# Patient Record
Sex: FEMALE | Race: WHITE | Employment: FULL TIME | URBAN - METROPOLITAN AREA
[De-identification: names, ages, dates, MRNs, and addresses within clinical notes are randomized per-mention and may not be internally consistent; named-entity substitution may affect disease eponyms.]

---

## 2018-11-05 ENCOUNTER — ANESTHESIA (OUTPATIENT)
Dept: LABOR AND DELIVERY | Age: 28
End: 2018-11-05
Payer: COMMERCIAL

## 2018-11-05 ENCOUNTER — HOSPITAL ENCOUNTER (INPATIENT)
Age: 28
LOS: 3 days | Discharge: HOME | End: 2018-11-08
Attending: OBSTETRICS & GYNECOLOGY | Admitting: OBSTETRICS & GYNECOLOGY
Payer: COMMERCIAL

## 2018-11-05 ENCOUNTER — ANESTHESIA EVENT (OUTPATIENT)
Dept: LABOR AND DELIVERY | Age: 28
End: 2018-11-05
Payer: COMMERCIAL

## 2018-11-05 DIAGNOSIS — G89.18 POST-OP PAIN: Primary | ICD-10-CM

## 2018-11-05 PROBLEM — Z37.9 NORMAL LABOR: Status: ACTIVE | Noted: 2018-11-05

## 2018-11-05 LAB
ABO + RH BLD: NORMAL
BLOOD GROUP ANTIBODIES SERPL: NORMAL
ERYTHROCYTE [DISTWIDTH] IN BLOOD BY AUTOMATED COUNT: 13.8 %
HCT VFR BLD AUTO: 35.4 % (ref 35.8–46.3)
HGB BLD-MCNC: 11.2 G/DL (ref 11.7–15.4)
MCH RBC QN AUTO: 26.9 PG (ref 26.1–32.9)
MCHC RBC AUTO-ENTMCNC: 31.6 G/DL (ref 31.4–35)
MCV RBC AUTO: 85.1 FL (ref 79.6–97.8)
NRBC # BLD: 0 K/UL (ref 0–0.2)
PLATELET # BLD AUTO: 314 K/UL (ref 150–450)
PMV BLD AUTO: 10 FL (ref 9.4–12.3)
RBC # BLD AUTO: 4.16 M/UL (ref 4.05–5.2)
SPECIMEN EXP DATE BLD: NORMAL
WBC # BLD AUTO: 13.2 K/UL (ref 4.3–11.1)

## 2018-11-05 PROCEDURE — A4300 CATH IMPL VASC ACCESS PORTAL: HCPCS | Performed by: ANESTHESIOLOGY

## 2018-11-05 PROCEDURE — 86901 BLOOD TYPING SEROLOGIC RH(D): CPT

## 2018-11-05 PROCEDURE — 74011000250 HC RX REV CODE- 250

## 2018-11-05 PROCEDURE — 77030011943

## 2018-11-05 PROCEDURE — 74011250636 HC RX REV CODE- 250/636

## 2018-11-05 PROCEDURE — 77030014125 HC TY EPDRL BBMI -B: Performed by: ANESTHESIOLOGY

## 2018-11-05 PROCEDURE — 36415 COLL VENOUS BLD VENIPUNCTURE: CPT

## 2018-11-05 PROCEDURE — 74011250636 HC RX REV CODE- 250/636: Performed by: OBSTETRICS & GYNECOLOGY

## 2018-11-05 PROCEDURE — 59025 FETAL NON-STRESS TEST: CPT

## 2018-11-05 PROCEDURE — 65270000029 HC RM PRIVATE

## 2018-11-05 PROCEDURE — 77030020254 HC SOL INJ D5LR LACTATED RINGER

## 2018-11-05 PROCEDURE — 99283 EMERGENCY DEPT VISIT LOW MDM: CPT

## 2018-11-05 PROCEDURE — 85027 COMPLETE CBC AUTOMATED: CPT

## 2018-11-05 RX ORDER — DEXTROSE, SODIUM CHLORIDE, SODIUM LACTATE, POTASSIUM CHLORIDE, AND CALCIUM CHLORIDE 5; .6; .31; .03; .02 G/100ML; G/100ML; G/100ML; G/100ML; G/100ML
125 INJECTION, SOLUTION INTRAVENOUS CONTINUOUS
Status: DISCONTINUED | OUTPATIENT
Start: 2018-11-05 | End: 2018-11-06

## 2018-11-05 RX ORDER — EPHEDRINE SULFATE 50 MG/ML
INJECTION, SOLUTION INTRAVENOUS AS NEEDED
Status: DISCONTINUED | OUTPATIENT
Start: 2018-11-05 | End: 2018-11-06 | Stop reason: HOSPADM

## 2018-11-05 RX ORDER — LIDOCAINE HYDROCHLORIDE 10 MG/ML
1 INJECTION INFILTRATION; PERINEURAL
Status: DISPENSED | OUTPATIENT
Start: 2018-11-05 | End: 2018-11-06

## 2018-11-05 RX ORDER — LIDOCAINE HYDROCHLORIDE AND EPINEPHRINE 15; 5 MG/ML; UG/ML
INJECTION, SOLUTION EPIDURAL
Status: COMPLETED | OUTPATIENT
Start: 2018-11-05 | End: 2018-11-05

## 2018-11-05 RX ORDER — BUTORPHANOL TARTRATE 1 MG/ML
1 INJECTION INTRAMUSCULAR; INTRAVENOUS
Status: DISCONTINUED | OUTPATIENT
Start: 2018-11-05 | End: 2018-11-06 | Stop reason: HOSPADM

## 2018-11-05 RX ORDER — OXYTOCIN/RINGER'S LACTATE 30/500 ML
250 PLASTIC BAG, INJECTION (ML) INTRAVENOUS ONCE
Status: DISCONTINUED | OUTPATIENT
Start: 2018-11-05 | End: 2018-11-05

## 2018-11-05 RX ORDER — SODIUM CHLORIDE 0.9 % (FLUSH) 0.9 %
5-10 SYRINGE (ML) INJECTION AS NEEDED
Status: DISCONTINUED | OUTPATIENT
Start: 2018-11-05 | End: 2018-11-06

## 2018-11-05 RX ORDER — FENTANYL CITRATE 50 UG/ML
100 INJECTION, SOLUTION INTRAMUSCULAR; INTRAVENOUS ONCE
Status: ACTIVE | OUTPATIENT
Start: 2018-11-05 | End: 2018-11-06

## 2018-11-05 RX ORDER — OXYTOCIN/RINGER'S LACTATE 30/500 ML
1-25 PLASTIC BAG, INJECTION (ML) INTRAVENOUS
Status: DISCONTINUED | OUTPATIENT
Start: 2018-11-05 | End: 2018-11-06

## 2018-11-05 RX ORDER — ROPIVACAINE HYDROCHLORIDE 2 MG/ML
INJECTION, SOLUTION EPIDURAL; INFILTRATION; PERINEURAL
Status: DISCONTINUED | OUTPATIENT
Start: 2018-11-05 | End: 2018-11-06 | Stop reason: HOSPADM

## 2018-11-05 RX ORDER — FENTANYL CITRATE 50 UG/ML
INJECTION, SOLUTION INTRAMUSCULAR; INTRAVENOUS AS NEEDED
Status: DISCONTINUED | OUTPATIENT
Start: 2018-11-05 | End: 2018-11-06 | Stop reason: HOSPADM

## 2018-11-05 RX ORDER — ONDANSETRON 2 MG/ML
4 INJECTION INTRAMUSCULAR; INTRAVENOUS
Status: DISCONTINUED | OUTPATIENT
Start: 2018-11-05 | End: 2018-11-06 | Stop reason: SDUPTHER

## 2018-11-05 RX ORDER — FENTANYL CITRATE 50 UG/ML
INJECTION, SOLUTION INTRAMUSCULAR; INTRAVENOUS
Status: COMPLETED
Start: 2018-11-05 | End: 2018-11-05

## 2018-11-05 RX ORDER — LIDOCAINE HYDROCHLORIDE 20 MG/ML
JELLY TOPICAL
Status: ACTIVE | OUTPATIENT
Start: 2018-11-05 | End: 2018-11-06

## 2018-11-05 RX ORDER — MINERAL OIL
120 OIL (ML) ORAL
Status: DISPENSED | OUTPATIENT
Start: 2018-11-05 | End: 2018-11-06

## 2018-11-05 RX ORDER — SODIUM CHLORIDE 0.9 % (FLUSH) 0.9 %
5-10 SYRINGE (ML) INJECTION EVERY 8 HOURS
Status: DISCONTINUED | OUTPATIENT
Start: 2018-11-05 | End: 2018-11-06

## 2018-11-05 RX ADMIN — SODIUM CHLORIDE, SODIUM LACTATE, POTASSIUM CHLORIDE, CALCIUM CHLORIDE AND DEXTROSE MONOHYDRATE 125 ML/HR: 5; 600; 310; 30; 20 INJECTION, SOLUTION INTRAVENOUS at 12:53

## 2018-11-05 RX ADMIN — FENTANYL CITRATE 15 MCG: 50 INJECTION, SOLUTION INTRAMUSCULAR; INTRAVENOUS at 14:46

## 2018-11-05 RX ADMIN — FENTANYL CITRATE 85 MCG: 50 INJECTION, SOLUTION INTRAMUSCULAR; INTRAVENOUS at 14:47

## 2018-11-05 RX ADMIN — SODIUM CHLORIDE, SODIUM LACTATE, POTASSIUM CHLORIDE, CALCIUM CHLORIDE AND DEXTROSE MONOHYDRATE 125 ML/HR: 5; 600; 310; 30; 20 INJECTION, SOLUTION INTRAVENOUS at 19:34

## 2018-11-05 RX ADMIN — OXYTOCIN 2 MILLI-UNITS/MIN: 10 INJECTION, SOLUTION INTRAMUSCULAR; INTRAVENOUS at 08:37

## 2018-11-05 RX ADMIN — LIDOCAINE HYDROCHLORIDE AND EPINEPHRINE 4.5 ML: 15; 5 INJECTION, SOLUTION EPIDURAL at 14:47

## 2018-11-05 RX ADMIN — SODIUM CHLORIDE, SODIUM LACTATE, POTASSIUM CHLORIDE, CALCIUM CHLORIDE AND DEXTROSE MONOHYDRATE 125 ML/HR: 5; 600; 310; 30; 20 INJECTION, SOLUTION INTRAVENOUS at 05:20

## 2018-11-05 RX ADMIN — EPHEDRINE SULFATE 10 MG: 50 INJECTION, SOLUTION INTRAVENOUS at 14:57

## 2018-11-05 RX ADMIN — ROPIVACAINE HYDROCHLORIDE: 2 INJECTION, SOLUTION EPIDURAL; INFILTRATION; PERINEURAL at 23:52

## 2018-11-05 RX ADMIN — ROPIVACAINE HYDROCHLORIDE 8.5 ML/HR: 2 INJECTION, SOLUTION EPIDURAL; INFILTRATION; PERINEURAL at 14:50

## 2018-11-05 RX ADMIN — FENTANYL CITRATE 100 MCG: 50 INJECTION, SOLUTION INTRAMUSCULAR; INTRAVENOUS at 23:52

## 2018-11-05 NOTE — PROGRESS NOTES
Denies needs, tolerating labor well. Family remains at [facility]. Koki tracing well.

## 2018-11-05 NOTE — H&P
History & Physical 
 
Name: Zeus Chavez MRN: [id number]  SSN: [id number] YOB: 1990  Age: 33 y.o. Sex: female Subjective: Pt presents in labor with SROM. Estimated Date of Delivery: 18 OB History  Para Term  AB Living 1 SAB TAB Ectopic Molar Multiple Live Births # Outcome Date GA Lbr Thaddeus/2nd Weight Sex Delivery Anes PTL Lv  
1 Current Ms. Liza Ward is seen with pregnancy at 39w3d for active labor. Prenatal course was normal. 
the patients states that the baby moves as usual 
 Please see prenatal records for details. Past Medical History:  
Diagnosis Date  Allergic rhinitis  Infertility, female  Polycystic disease, ovaries  Psychiatric problem   
 depression-not on medication during pregnancy Past Surgical History:  
Procedure Laterality Date  HX HEENT    
 tonsils and adenoids  HX OTHER SURGICAL    
 tonsils and adenoids removed Social History Occupational History  Not on file Tobacco Use  Smoking status: Former Smoker  Smokeless tobacco: Never Used Substance and Sexual Activity  Alcohol use: No  
  Comment: 8 to 10 glasses of wine per week  Drug use: No  
 Sexual activity: Not on file Family History Problem Relation Age of Onset  Diabetes Mother  Cancer Mother  Hypertension Father  Diabetes Father  Cancer Maternal Aunt  Cancer Maternal Grandmother  Diabetes Maternal Grandmother  Hypertension Maternal Grandfather No Known Allergies Prior to Admission medications Medication Sig Start Date End Date Taking? Authorizing Provider PNV66-Iron Fumarate-FA-DSS-DHA 26-1.2- mg cap Take 1 Tab by mouth. Yes Provider, Historical  
ethinyl estradiol-etonogestrel (NUVARING) 0.12-0.015 mg/24 hr vaginal ring 1 Each by Intravaginal route every twenty-eight (28) days.  10/24/16   Dawit Alcaraz MD  
 escitalopram oxalate (LEXAPRO) 10 mg tablet Take 1 Tab by mouth daily. 16   Kae Amos MD  
LORazepam (ATIVAN) 0.5 mg tablet Take 1 Tab by mouth every eight (8) hours as needed for Anxiety. Max Daily Amount: 1.5 mg. 16   Kae Amos MD  
scopolamine (TRANSDERM-SCOP) 1.5 mg (1 mg over 3 days) pt3d 1 Patch by TransDERmal route every seventy-two (72) hours. 16   Kae Amos MD  
ipratropium (ATROVENT) 0.06 % nasal spray 2 Sprays by Both Nostrils route every eight (8) hours as needed for Rhinitis. 4/30/15   Chantel Marion MD  
  
 
Review of Systems: 
Constitutional:No headache, fever Cardiac:   No chest pain Resp: No cough or shortness of breath GI:   No nausea/vomiting, diarrhea, abdominal pain :   No dysuria Neuro:     No vision changes, headache Objective:  
 
Vitals: 
Vitals:  
 18 0414 18 0421 BP: 127/62 Pulse: (!) 104 Resp: 18 Temp: 98.3 °F (36.8 °C) Weight:  132.5 kg (292 lb) Height:  5' 5\" (1.651 m) Physical Exam: 
Patient without distress. Heart: Regular rate and rhythm Lung: clear to auscultation throughout lung fields, no wheezes, no rales, no rhonchi and normal respiratory effort Back: costovertebral angle tenderness absent Abdomen: soft, nontender Fundus: soft and non tender Cervical Exam: 3 cm dilated 70% effaced   
-1 station Presenting Part: cephalic Cervical Position: anterior Lower Extremities:  - Edema No 
Membranes:  Spontaneous Rupture of Membranes; Amniotic Fluid: clear fluid Fetal Heart Rate: Baseline: 135 per minute Variability: moderate Accelerations: yes Decelerations: none Uterine contractions: regular, every 3 minutes Prenatal Labs:  
No results found for: RUBELLAEXT, GRBSEXT, HBSAGEXT, HIVEXT, RPREXT, GONNOEXT, CHLAMEXT Assessment/Plan:  
 
Ms. Roberto Gonzalez is a  seen with pregnancy at 39w3d for active labor. No results found for: GRBSEXT Plan: Admit for labor management Patient discussed with Dr. Waylon Macedo.

## 2018-11-05 NOTE — ANESTHESIA PROCEDURE NOTES
CSE Block Start time: 11/5/2018 2:40 PM 
End time: 11/5/2018 2:47 PM 
Performed by: Randy Kim MD 
Authorized by: Randy Kim MD  
 
Pre-Procedure Indications: at surgeon's request, procedure for pain and primary anesthetic   
preanesthetic checklist: patient identified, risks and benefits discussed, anesthesia consent, site marked, patient being monitored and timeout performed Timeout Time: 14:38 Procedure:  
Patient Position:  Seated Prep Region:  Lumbar Prep: chlorhexidine Location:  L2-3 Epidural Needle:  
Needle Type:  Tuohy Needle Gauge:  18 G Injection Technique:  Loss of resistance using saline Attempts:  1 Spinal Needle:  
Needle Type:  Quincke Needle Gauge:  25 G Catheter:  
Catheter Type:  Open end Catheter Size:  20 G Catheter at Skin Depth (cm):  5 Depth in Epidural Space (cm):  5 Events: no blood with aspiration, no cerebrospinal fluid with aspiration, no paresthesia and negative aspiration test   
Test Dose:  Lidocaine 1.5% w/ epi and negative Assessment:  
Catheter Secured:  Tegaderm and tape Insertion:  Uncomplicated Patient tolerance:  Patient tolerated the procedure well with no immediate complications

## 2018-11-05 NOTE — H&P
History & Physical 
 
Name: Callie Dickens MRN: [id number]  SSN: [id number] YOB: 1990  Age: 33 y.o. Sex: female Subjective:  
 
Estimated Date of Delivery: 18 OB History  Para Term  AB Living 1 SAB TAB Ectopic Molar Multiple Live Births # Outcome Date GA Lbr Thaddeus/2nd Weight Sex Delivery Anes PTL Lv  
1 Current Ms. Jesse Chou is admitted with pregnancy at 39w3d for Presumptive ROM . Prenatal course was complicated by elevated 1 hr gtt, normal 3 hour. Velamentous cord insertion. Dilated fetal kidneys, L 9 mm at 36 weeks. . Please see prenatal records for details. Past Medical History:  
Diagnosis Date  Allergic rhinitis  Infertility, female  Polycystic disease, ovaries  Psychiatric problem   
 depression-not on medication during pregnancy Past Surgical History:  
Procedure Laterality Date  HX HEENT    
 tonsils and adenoids  HX OTHER SURGICAL    
 tonsils and adenoids removed Social History Occupational History  Not on file Tobacco Use  Smoking status: Former Smoker  Smokeless tobacco: Never Used Substance and Sexual Activity  Alcohol use: No  
  Comment: 8 to 10 glasses of wine per week  Drug use: No  
 Sexual activity: Not on file Family History Problem Relation Age of Onset  Diabetes Mother  Cancer Mother  Hypertension Father  Diabetes Father  Cancer Maternal Aunt  Cancer Maternal Grandmother  Diabetes Maternal Grandmother  Hypertension Maternal Grandfather No Known Allergies Prior to Admission medications Medication Sig Start Date End Date Taking? Authorizing Provider PNV66-Iron Fumarate-FA-DSS-DHA 26-1.2- mg cap Take 1 Tab by mouth. Yes Provider, Historical  
ethinyl estradiol-etonogestrel (NUVARING) 0.12-0.015 mg/24 hr vaginal ring 1 Each by Intravaginal route every twenty-eight (28) days.  10/24/16 Mary Benson MD  
escitalopram oxalate (LEXAPRO) 10 mg tablet Take 1 Tab by mouth daily. 2/5/16   Mary Benson MD  
LORazepam (ATIVAN) 0.5 mg tablet Take 1 Tab by mouth every eight (8) hours as needed for Anxiety. Max Daily Amount: 1.5 mg. 2/5/16   Mary Benson MD  
scopolamine (TRANSDERM-SCOP) 1.5 mg (1 mg over 3 days) pt3d 1 Patch by TransDERmal route every seventy-two (72) hours. 2/5/16   Mary Benson MD  
ipratropium (ATROVENT) 0.06 % nasal spray 2 Sprays by Both Nostrils route every eight (8) hours as needed for Rhinitis. 4/30/15   Ana Salas MD  
  
 
Review of Systems: A comprehensive review of systems was negative except for that written in the HPI. Objective:  
 
Vitals: 
Vitals:  
 11/05/18 0602 11/05/18 0636 11/05/18 0731 11/05/18 0800 BP: 107/59 114/59 110/61 132/71 Pulse: 79 78 72 76 Resp:      
Temp:      
Weight:      
Height:      
  
 
Physical Exam: 
Patient without distress. Heart: Regular rate and rhythm, S1S2 present or without murmur or extra heart sounds Lung: clear to auscultation throughout lung fields, no wheezes, no rales, no rhonchi and normal respiratory effort Abdomen: soft, nontender Fundus: soft and non tender Perineum: blood absent, amniotic fluid present Cervical Exam: 3/70/-2 per MD at night Lower Extremities:  - Edema No 
 - No evidence of DVT seen on physical exam. 
Membranes:  Premature Rupture of Membranes; Amniotic Fluid: clear fluid Fetal Heart Rate: Reactive Baseline: 130 per minute Variability: moderate Accelerations: yes Decelerations: none Uterine contractions: not tracing contractions well Prenatal Labs:  
Lab Results Component Value Date/Time ABO/Rh(D) A POSITIVE 11/05/2018 05:07 AM  
 
 
 
Assessment/Plan: Active Problems: 
  Normal labor (11/5/2018) Plan: Admit for Reassuring fetal status, Labor  Not progressing normally start pitocin augmentation, Continue plan for vaginal delivery. Group B Strep was negative. Fetal renal dilaton- will notify peds following delivery Elevated 1 hr gtt, normal 3 hr gtt. 36 wk US growth was 52% 6 lb 13 oz. Signed By:  Enid Manriquez MD   
 November 5, 2018

## 2018-11-05 NOTE — PROGRESS NOTES
Pt to triage room ALYCIA 01 with c/o SROM. Pt denies any VB. EFM/[place] applied. Triage database and assessment completed. SVE deferred. Triage process explained to pt. Pt instructed on call light and placed within reach. Verbalized understanding to all teaching. Pt states she feels good fetal movement.   This RN will notify Dr Jeffy Glover of pt arrival, gest age, contraction pattern, hx etc.

## 2018-11-05 NOTE — PROGRESS NOTES
Labor Note S: comfortable with SARAH 
 
O:  
Visit Vitals /60 Pulse 77 Temp 98 °F (36.7 °C) Resp 18 Ht 5' 5\" (1.651 m) Wt 132.5 kg (292 lb) Breastfeeding? Yes  
BMI 48.59 kg/m² Gen- NAD 
SVE- 8-9/100/-1 FHT - baseline 135, mod variability , + accels , no decels [place]- ctx q 2-6 (has runs of coupling etc) A/P: 33 y.o. G1 @ 39 3/7 
1) Term IUP- Cat 1 
2) Labor- on 14 mU of pitocin, making good change. On peanut to aide with descent 3) GBS neg

## 2018-11-05 NOTE — PROGRESS NOTES
Dr. Eliseo Farooq at [facility], [address] [facility] discussed, pt verbalizes understanding. Orders received.

## 2018-11-05 NOTE — PROGRESS NOTES
EPIDURAL PLACEMENT Dr Tierney Conway at bedside at [phone]. ESTEBAN Cobb at bedside at [phone] Assisted pt to sitting up on bedside at 1430. Timeout completed at [phone] with MD, ESTEBAN and myself at bedside. Test dose given at 1447. Negative reaction. Dose given at 1452. Pt assisted to lying back in left tilt position. See anesthesia record for details. See vital sign flow sheet for BP. Tolerated procedure well.

## 2018-11-05 NOTE — PROGRESS NOTES
Labor Note S: contractions more consistent. Feels every other contraction, rates 4/10. O:  
Visit Vitals /69 Pulse 75 Temp 97.6 °F (36.4 °C) Resp 18 Ht 5' 5\" (1.651 m) Wt 132.5 kg (292 lb) Breastfeeding? Yes  
BMI 48.59 kg/m² Gen- NAD 
SVE- deferred FHT - baseline 130, mod variability, + accels, one decel with maternal movement otherwise very reassuring [place]- ctx q 2-4, some coupling A/P: 33 y.o. G1 @ 39 3/7 
1) Term IUP- Cat II but overall very reassuring 2) Labor- continue to increase pitocin. Currently on 10 mU. 3) GBS neg

## 2018-11-05 NOTE — PROGRESS NOTES
Spoke with Dr. Nnamdi Luna on the phone regarding patient's status. Updated MD about patient's SVE and that patient states she is not feeling ctx. MD stated that she would be on the floor to assess patient in the next 30 minutes and decide continuing POC.

## 2018-11-05 NOTE — ANESTHESIA PREPROCEDURE EVALUATION
Anesthetic History No history of anesthetic complications Review of Systems / Medical History Patient summary reviewed and pertinent labs reviewed Pulmonary Within defined limits Neuro/Psych Within defined limits Cardiovascular Exercise tolerance: >4 METS 
  
GI/Hepatic/Renal 
  
GERD: well controlled Endo/Other Obesity Other Findings Physical Exam 
 
Airway Mallampati: II 
TM Distance: 4 - 6 cm Neck ROM: normal range of motion Mouth opening: Normal 
 
 Cardiovascular Regular rate and rhythm,  S1 and S2 normal,  no murmur, click, rub, or gallop Dental 
No notable dental hx Pulmonary Breath sounds clear to auscultation Abdominal 
GI exam deferred Other Findings Anesthetic Plan ASA: 3 Anesthesia type: CSE Post-op pain plan if not by surgeon: indwelling epidural catheter, epidural opioid and intrathecal opiates Anesthetic plan and risks discussed with: Patient and Spouse

## 2018-11-05 NOTE — PROGRESS NOTES
I&O cath for 200 cc urine. SVE 8/100/-1. Dr Bev Walters at bedside. Strip reviewed. Pt repositioned to right side on peanut ball.

## 2018-11-06 LAB
ARTERIAL PATENCY WRIST A: NORMAL
BASE DEFICIT BLD-SCNC: 6 MMOL/L
BDY SITE: NORMAL
BODY TEMPERATURE: 98.6
GAS FLOW.O2 O2 DELIVERY SYS: NORMAL L/MIN
PCO2 BLDCO: 41 MMHG (ref 32–68)
PH BLDCO: 7.31 [PH] (ref 7.15–7.38)
PO2 BLDCO: 21 MMHG
SERVICE CMNT-IMP: NORMAL
SPECIMEN TYPE: NORMAL

## 2018-11-06 PROCEDURE — 77030018836 HC SOL IRR NACL ICUM -A: Performed by: OBSTETRICS & GYNECOLOGY

## 2018-11-06 PROCEDURE — 74011250636 HC RX REV CODE- 250/636: Performed by: ANESTHESIOLOGY

## 2018-11-06 PROCEDURE — 77030032490 HC SLV COMPR SCD KNE COVD -B

## 2018-11-06 PROCEDURE — 76010000391 HC C SECN FIRST 1 HR: Performed by: OBSTETRICS & GYNECOLOGY

## 2018-11-06 PROCEDURE — 0HQ9XZZ REPAIR PERINEUM SKIN, EXTERNAL APPROACH: ICD-10-PCS | Performed by: OBSTETRICS & GYNECOLOGY

## 2018-11-06 PROCEDURE — 74011250636 HC RX REV CODE- 250/636: Performed by: OBSTETRICS & GYNECOLOGY

## 2018-11-06 PROCEDURE — 74011000250 HC RX REV CODE- 250: Performed by: ANESTHESIOLOGY

## 2018-11-06 PROCEDURE — 4A1HXCZ MONITORING OF PRODUCTS OF CONCEPTION, CARDIAC RATE, EXTERNAL APPROACH: ICD-10-PCS | Performed by: OBSTETRICS & GYNECOLOGY

## 2018-11-06 PROCEDURE — 77030002974 HC SUT PLN J&J -A: Performed by: OBSTETRICS & GYNECOLOGY

## 2018-11-06 PROCEDURE — 77030002966 HC SUT PDS J&J -A: Performed by: OBSTETRICS & GYNECOLOGY

## 2018-11-06 PROCEDURE — 75410000003 HC RECOV DEL/VAG/CSECN EA 0.5 HR: Performed by: OBSTETRICS & GYNECOLOGY

## 2018-11-06 PROCEDURE — 74011250637 HC RX REV CODE- 250/637: Performed by: ANESTHESIOLOGY

## 2018-11-06 PROCEDURE — 74011250636 HC RX REV CODE- 250/636

## 2018-11-06 PROCEDURE — 77030031139 HC SUT VCRL2 J&J -A: Performed by: OBSTETRICS & GYNECOLOGY

## 2018-11-06 PROCEDURE — 74011250637 HC RX REV CODE- 250/637: Performed by: OBSTETRICS & GYNECOLOGY

## 2018-11-06 PROCEDURE — 76010000392 HC C SECN EA ADDL 0.5 HR: Performed by: OBSTETRICS & GYNECOLOGY

## 2018-11-06 PROCEDURE — 77030002888 HC SUT CHRMC J&J -A: Performed by: OBSTETRICS & GYNECOLOGY

## 2018-11-06 PROCEDURE — 77030018846 HC SOL IRR STRL H20 ICUM -A: Performed by: OBSTETRICS & GYNECOLOGY

## 2018-11-06 PROCEDURE — 76060000078 HC EPIDURAL ANESTHESIA: Performed by: OBSTETRICS & GYNECOLOGY

## 2018-11-06 PROCEDURE — 82803 BLOOD GASES ANY COMBINATION: CPT

## 2018-11-06 PROCEDURE — 77030002933 HC SUT MCRYL J&J -A: Performed by: OBSTETRICS & GYNECOLOGY

## 2018-11-06 PROCEDURE — 65270000029 HC RM PRIVATE

## 2018-11-06 PROCEDURE — 77030005537 HC CATH URETH BARD -A: Performed by: OBSTETRICS & GYNECOLOGY

## 2018-11-06 PROCEDURE — 77030034696 HC CATH URETH FOL 2W BARD -A

## 2018-11-06 RX ORDER — HYDROMORPHONE HYDROCHLORIDE 2 MG/ML
0.5 INJECTION, SOLUTION INTRAMUSCULAR; INTRAVENOUS; SUBCUTANEOUS AS NEEDED
Status: DISCONTINUED | OUTPATIENT
Start: 2018-11-06 | End: 2018-11-06

## 2018-11-06 RX ORDER — DOCUSATE SODIUM 100 MG/1
100 CAPSULE, LIQUID FILLED ORAL 2 TIMES DAILY
Status: DISCONTINUED | OUTPATIENT
Start: 2018-11-06 | End: 2018-11-08 | Stop reason: HOSPADM

## 2018-11-06 RX ORDER — OXYCODONE HYDROCHLORIDE 5 MG/1
10 TABLET ORAL
Status: DISCONTINUED | OUTPATIENT
Start: 2018-11-06 | End: 2018-11-06

## 2018-11-06 RX ORDER — SODIUM CHLORIDE, SODIUM LACTATE, POTASSIUM CHLORIDE, CALCIUM CHLORIDE 600; 310; 30; 20 MG/100ML; MG/100ML; MG/100ML; MG/100ML
INJECTION, SOLUTION INTRAVENOUS
Status: DISCONTINUED | OUTPATIENT
Start: 2018-11-06 | End: 2018-11-06 | Stop reason: HOSPADM

## 2018-11-06 RX ORDER — ONDANSETRON 4 MG/1
4 TABLET, ORALLY DISINTEGRATING ORAL
Status: DISCONTINUED | OUTPATIENT
Start: 2018-11-06 | End: 2018-11-08 | Stop reason: HOSPADM

## 2018-11-06 RX ORDER — OXYTOCIN/RINGER'S LACTATE 30/500 ML
PLASTIC BAG, INJECTION (ML) INTRAVENOUS
Status: DISCONTINUED | OUTPATIENT
Start: 2018-11-06 | End: 2018-11-06 | Stop reason: HOSPADM

## 2018-11-06 RX ORDER — OXYCODONE HYDROCHLORIDE 5 MG/1
5 TABLET ORAL
Status: DISPENSED | OUTPATIENT
Start: 2018-11-06 | End: 2018-11-07

## 2018-11-06 RX ORDER — HYDROMORPHONE HYDROCHLORIDE 2 MG/ML
0.5 INJECTION, SOLUTION INTRAMUSCULAR; INTRAVENOUS; SUBCUTANEOUS
Status: ACTIVE | OUTPATIENT
Start: 2018-11-06 | End: 2018-11-07

## 2018-11-06 RX ORDER — NALOXONE HYDROCHLORIDE 0.4 MG/ML
0.2 INJECTION, SOLUTION INTRAMUSCULAR; INTRAVENOUS; SUBCUTANEOUS
Status: DISCONTINUED | OUTPATIENT
Start: 2018-11-06 | End: 2018-11-06

## 2018-11-06 RX ORDER — KETOROLAC TROMETHAMINE 30 MG/ML
30 INJECTION, SOLUTION INTRAMUSCULAR; INTRAVENOUS EVERY 6 HOURS
Status: COMPLETED | OUTPATIENT
Start: 2018-11-06 | End: 2018-11-07

## 2018-11-06 RX ORDER — HALOPERIDOL 5 MG/ML
1 INJECTION INTRAMUSCULAR
Status: ACTIVE | OUTPATIENT
Start: 2018-11-06 | End: 2018-11-07

## 2018-11-06 RX ORDER — ONDANSETRON 2 MG/ML
INJECTION INTRAMUSCULAR; INTRAVENOUS AS NEEDED
Status: DISCONTINUED | OUTPATIENT
Start: 2018-11-06 | End: 2018-11-06 | Stop reason: HOSPADM

## 2018-11-06 RX ORDER — MORPHINE SULFATE 0.5 MG/ML
INJECTION, SOLUTION EPIDURAL; INTRATHECAL; INTRAVENOUS AS NEEDED
Status: DISCONTINUED | OUTPATIENT
Start: 2018-11-06 | End: 2018-11-06 | Stop reason: HOSPADM

## 2018-11-06 RX ORDER — ONDANSETRON 2 MG/ML
4 INJECTION INTRAMUSCULAR; INTRAVENOUS
Status: DISCONTINUED | OUTPATIENT
Start: 2018-11-06 | End: 2018-11-08 | Stop reason: HOSPADM

## 2018-11-06 RX ORDER — SODIUM CHLORIDE, SODIUM LACTATE, POTASSIUM CHLORIDE, CALCIUM CHLORIDE 600; 310; 30; 20 MG/100ML; MG/100ML; MG/100ML; MG/100ML
75 INJECTION, SOLUTION INTRAVENOUS CONTINUOUS
Status: DISCONTINUED | OUTPATIENT
Start: 2018-11-06 | End: 2018-11-08 | Stop reason: HOSPADM

## 2018-11-06 RX ORDER — KETOROLAC TROMETHAMINE 30 MG/ML
30 INJECTION, SOLUTION INTRAMUSCULAR; INTRAVENOUS
Status: DISCONTINUED | OUTPATIENT
Start: 2018-11-06 | End: 2018-11-06

## 2018-11-06 RX ORDER — NALOXONE HYDROCHLORIDE 0.4 MG/ML
0.4 INJECTION, SOLUTION INTRAMUSCULAR; INTRAVENOUS; SUBCUTANEOUS AS NEEDED
Status: DISCONTINUED | OUTPATIENT
Start: 2018-11-06 | End: 2018-11-08 | Stop reason: HOSPADM

## 2018-11-06 RX ORDER — SODIUM CHLORIDE 0.9 % (FLUSH) 0.9 %
5-10 SYRINGE (ML) INJECTION EVERY 8 HOURS
Status: DISCONTINUED | OUTPATIENT
Start: 2018-11-06 | End: 2018-11-08 | Stop reason: HOSPADM

## 2018-11-06 RX ORDER — SODIUM CHLORIDE 0.9 % (FLUSH) 0.9 %
5-10 SYRINGE (ML) INJECTION AS NEEDED
Status: DISCONTINUED | OUTPATIENT
Start: 2018-11-06 | End: 2018-11-08 | Stop reason: HOSPADM

## 2018-11-06 RX ORDER — SIMETHICONE 80 MG
80 TABLET,CHEWABLE ORAL
Status: DISCONTINUED | OUTPATIENT
Start: 2018-11-06 | End: 2018-11-08 | Stop reason: HOSPADM

## 2018-11-06 RX ORDER — ACETAMINOPHEN 500 MG
1000 TABLET ORAL 3 TIMES DAILY
Status: COMPLETED | OUTPATIENT
Start: 2018-11-06 | End: 2018-11-07

## 2018-11-06 RX ORDER — HYDROMORPHONE HYDROCHLORIDE 2 MG/ML
0.5 INJECTION, SOLUTION INTRAMUSCULAR; INTRAVENOUS; SUBCUTANEOUS
Status: DISCONTINUED | OUTPATIENT
Start: 2018-11-06 | End: 2018-11-06

## 2018-11-06 RX ORDER — HYDROCODONE BITARTRATE AND ACETAMINOPHEN 5; 325 MG/1; MG/1
2 TABLET ORAL AS NEEDED
Status: DISCONTINUED | OUTPATIENT
Start: 2018-11-06 | End: 2018-11-06 | Stop reason: ALTCHOICE

## 2018-11-06 RX ORDER — DIPHENHYDRAMINE HCL 25 MG
25 CAPSULE ORAL
Status: DISCONTINUED | OUTPATIENT
Start: 2018-11-06 | End: 2018-11-08 | Stop reason: HOSPADM

## 2018-11-06 RX ORDER — OXYCODONE HYDROCHLORIDE 5 MG/1
5 TABLET ORAL
Status: DISCONTINUED | OUTPATIENT
Start: 2018-11-06 | End: 2018-11-06

## 2018-11-06 RX ADMIN — SODIUM CHLORIDE, SODIUM LACTATE, POTASSIUM CHLORIDE, CALCIUM CHLORIDE: 600; 310; 30; 20 INJECTION, SOLUTION INTRAVENOUS at 01:50

## 2018-11-06 RX ADMIN — Medication 3 G: at 01:39

## 2018-11-06 RX ADMIN — Medication 500 ML/HR: at 02:16

## 2018-11-06 RX ADMIN — ONDANSETRON 4 MG: 2 INJECTION INTRAMUSCULAR; INTRAVENOUS at 02:16

## 2018-11-06 RX ADMIN — KETOROLAC TROMETHAMINE 30 MG: 30 INJECTION, SOLUTION INTRAMUSCULAR at 16:16

## 2018-11-06 RX ADMIN — HYDROMORPHONE HYDROCHLORIDE 0.5 MG: 2 INJECTION, SOLUTION INTRAMUSCULAR; INTRAVENOUS; SUBCUTANEOUS at 04:42

## 2018-11-06 RX ADMIN — SIMETHICONE CHEW TAB 80 MG 80 MG: 80 TABLET ORAL at 23:16

## 2018-11-06 RX ADMIN — ACETAMINOPHEN 1000 MG: 500 TABLET, FILM COATED ORAL at 20:40

## 2018-11-06 RX ADMIN — OXYCODONE HYDROCHLORIDE 5 MG: 5 TABLET ORAL at 20:41

## 2018-11-06 RX ADMIN — SODIUM CHLORIDE, SODIUM LACTATE, POTASSIUM CHLORIDE, CALCIUM CHLORIDE: 600; 310; 30; 20 INJECTION, SOLUTION INTRAVENOUS at 03:02

## 2018-11-06 RX ADMIN — MORPHINE SULFATE 5 MG: 0.5 INJECTION, SOLUTION EPIDURAL; INTRATHECAL; INTRAVENOUS at 02:47

## 2018-11-06 RX ADMIN — SIMETHICONE CHEW TAB 80 MG 80 MG: 80 TABLET ORAL at 16:16

## 2018-11-06 RX ADMIN — FAMOTIDINE 20 MG: 10 INJECTION, SOLUTION INTRAVENOUS at 01:39

## 2018-11-06 RX ADMIN — DOCUSATE SODIUM 100 MG: 100 CAPSULE, LIQUID FILLED ORAL at 12:24

## 2018-11-06 RX ADMIN — KETOROLAC TROMETHAMINE 30 MG: 30 INJECTION, SOLUTION INTRAMUSCULAR at 09:45

## 2018-11-06 RX ADMIN — OXYCODONE HYDROCHLORIDE 10 MG: 5 TABLET ORAL at 06:14

## 2018-11-06 RX ADMIN — ACETAMINOPHEN 1000 MG: 500 TABLET, FILM COATED ORAL at 12:24

## 2018-11-06 RX ADMIN — AZITHROMYCIN MONOHYDRATE 500 MG: 500 INJECTION, POWDER, LYOPHILIZED, FOR SOLUTION INTRAVENOUS at 02:16

## 2018-11-06 RX ADMIN — KETOROLAC TROMETHAMINE 30 MG: 30 INJECTION, SOLUTION INTRAMUSCULAR at 23:16

## 2018-11-06 NOTE — PROGRESS NOTES
Pt up to bathroom. Not able to void at present. Instructed on jazzy-care. Back to bed without assist. PO fluids encouraged.

## 2018-11-06 NOTE — PROGRESS NOTES
Post-Operative Day Number 0 Progress Note Patient doing well post-op day 0 from  delivery without significant complaints. Pain controlled on current medication. Voiding without difficulty, normal lochia. Vitals:   
Patient Vitals for the past 8 hrs: 
 BP Temp Pulse Resp SpO2  
18 1250 130/64 98.2 °F (36.8 °C) 96 18 97 % 18 0830 96/49 98.3 °F (36.8 °C) (!) 103 18   
18 0730 113/55 98.3 °F (36.8 °C) (!) 104 18 98 % 18 0630 109/55  95 20 98 % Temp (24hrs), Av.6 °F (37 °C), Min:97.9 °F (36.6 °C), Max:100.7 °F (38.2 °C) Vital signs stable, afebrile. Exam:  Patient without distress. Abdomen soft, fundus firm at level of umbilicus, non tender. Incision dry and clean without erythema. Lower extremities are negative for swelling, cords or tenderness. Lab/Data Review: CBC: No results found for: WBC, HGB, HGBEXT, HCT, HCTEXT, PLT, PLTEXT, HGBEXT, HCTEXT, PLTEXT Assessment and Plan:  Patient appears to be having uncomplicated post- course. Continue routine post-op care and maternal education.

## 2018-11-06 NOTE — PROGRESS NOTES
Patient up to bathroom with assistance. Wendy-care taught and completed. Questions encouraged and answered. Patient ambulating without difficulty, encouraged to call for needs or concerns. Verbalizes understanding.

## 2018-11-06 NOTE — PROGRESS NOTES
Pt states she has not tried to void. Instructed to get up and try within 20 minutes. Peppermint oil provided. Will continue to monitor.

## 2018-11-06 NOTE — PROGRESS NOTES
Dr Rere Powers in room to assess patient , she has been pushing since . Dr Rere Powers discussed with patient and she agrees to have

## 2018-11-06 NOTE — ROUTINE PROCESS
SBAR IN Report: Mother Verbal report received from Wesley Landon RN (full name & credentials) on this patient, who is now being transferred from L & D (unit) for routine progression of care. The patient is wearing a green \"Anesthesia-Duramorph\" band. Report consisted of patient's Situation, Background, Assessment and Recommendations (SBAR).  ID bands were compared with the identification form, and verified with the patient and transferring nurse. Information from the SBAR and the [place] Report was reviewed with the transferring nurse; opportunity for questions and clarification provided.

## 2018-11-06 NOTE — ANESTHESIA POSTPROCEDURE EVALUATION
Procedure(s):  SECTION. Anesthesia Post Evaluation Multimodal analgesia: multimodal analgesia not used between 6 hours prior to anesthesia start to PACU discharge Patient location during evaluation: bedside Patient participation: complete - patient participated Level of consciousness: awake and alert Pain score: 3 Pain management: adequate Airway patency: patent Anesthetic complications: no 
Cardiovascular status: acceptable and hemodynamically stable Respiratory status: acceptable Hydration status: acceptable Visit Vitals /64 (BP 1 Location: Left arm, BP Patient Position: At rest) Pulse 94 Temp 37.3 °C (99.1 °F) Resp 22 Ht 5' 5\" (1.651 m) Wt 132.5 kg (292 lb) SpO2 98% Breastfeeding? Yes  
BMI 48.59 kg/m²

## 2018-11-06 NOTE — PROGRESS NOTES
SBAR OUT Report: Mother Verbal report given to Mely Araiza (full name & credentials) on this patient, who is now being transferred to MIU (unit) for routine progression of care. The patient is not wearing a green \"Anesthesia-Duramorph\" band. Report consisted of patient's Situation, Background, Assessment and Recommendations (SBAR).  ID bands were compared with the identification form, and verified with the patient and receiving nurse. Information from the SBAR, OR Summary, Procedure Summary, Intake/Output and MAR and the [place] Report was reviewed with the receiving nurse; opportunity for questions and clarification provided.

## 2018-11-06 NOTE — PROGRESS NOTES
Labor Note S: comfortable with SARAH 
 
O:  
Visit Vitals /55 Pulse (!) 101 Temp 98.2 °F (36.8 °C) Resp 18 Ht 5' 5\" (1.651 m) Wt 132.5 kg (292 lb) Breastfeeding? Yes  
BMI 48.59 kg/m² Gen- NAD 
SVE- c/+2, has not made more descent despite pushing x 3 hours FHT - baseline 160-170, mod variability, + accels , decels with pushing [place]- ctx q 2-3 A/P: 33 y.o. G1 @ 44  
1) Term IUP- Cat 2 tracing now with fetal tachy. 2) Labor-not progressing well. Position likely OP, attempted manual rotation several times but rotates back. Discussed for CPD or malposition as the reason for arrest of descent. Hesitant to assist vaginally due to concern for a large baby and do not want to deliver into a dystocia. Will signs of impending chorio (fetal tachy), recommend . R/B/A of surgery discussed, all questions answered and consents signed. Risks include but are not limited to pain, bleeding, infection, damage to bowel, bladder, blood vessels, nerves, ureters, baby. Risk of bleeding and need for transfusion discussed. Rare risk of hysterectomy if bleeding not controlled in any other fashion. Verbally consents for procedure. 3) GBS neg

## 2018-11-06 NOTE — LACTATION NOTE
This note was copied from a baby's chart. Assisted with attempt at breast in football on L. No latch. Mom requested to start pumping earlier. Started mom pumping on initiation, changed to a 21 mm flange. Gave 0.25 ml colostrum in a straight syringe. Discussed normal  behavior. May take baby a little while to figure out how to nurse well. Plan to continued trying at breast and pumping if no latch. Will follow output and weight loss. Will continue to assist with positioning and technique. Encouraged attempt at breast and follow plan.

## 2018-11-06 NOTE — PROGRESS NOTES
Results for Ollis Gilford (MRN [id number]) as of 11/6/2018 02:33 Ref. Range 11/6/2018 02:29  
pCO2 cord blood Latest Ref Range: 32 - 68 mmHg 41  
pO2 cord blood Latest Units: mmHg 21 Base deficit (POC) Latest Units: mmol/L 6 Patient temp. Latest Units:   98.6 Specimen type (POC) Latest Units:   VENOUS CORD  
pH, cord blood (POC) Latest Ref Range: 7.15 - 7.38   7.308 Site Latest Units:   CORD Device: Latest Units:   ROOM AIR Allens test (POC) Latest Units:   NOT APPLICABLE Insufficient sample for arterial cord gas. No results available.

## 2018-11-06 NOTE — LACTATION NOTE

## 2018-11-07 LAB
HCT VFR BLD AUTO: 26 % (ref 35.8–46.3)
HGB BLD-MCNC: 8.1 G/DL (ref 11.7–15.4)

## 2018-11-07 PROCEDURE — 36415 COLL VENOUS BLD VENIPUNCTURE: CPT

## 2018-11-07 PROCEDURE — 65270000029 HC RM PRIVATE

## 2018-11-07 PROCEDURE — 74011250637 HC RX REV CODE- 250/637: Performed by: ANESTHESIOLOGY

## 2018-11-07 PROCEDURE — 74011250636 HC RX REV CODE- 250/636: Performed by: ANESTHESIOLOGY

## 2018-11-07 PROCEDURE — 74011250637 HC RX REV CODE- 250/637: Performed by: OBSTETRICS & GYNECOLOGY

## 2018-11-07 PROCEDURE — 85014 HEMATOCRIT: CPT

## 2018-11-07 RX ORDER — IBUPROFEN 800 MG/1
800 TABLET ORAL
Status: DISCONTINUED | OUTPATIENT
Start: 2018-11-07 | End: 2018-11-08 | Stop reason: HOSPADM

## 2018-11-07 RX ORDER — HYDROCODONE BITARTRATE AND ACETAMINOPHEN 7.5; 325 MG/1; MG/1
1 TABLET ORAL
Status: DISCONTINUED | OUTPATIENT
Start: 2018-11-07 | End: 2018-11-08 | Stop reason: HOSPADM

## 2018-11-07 RX ORDER — MORPHINE SULFATE 2 MG/ML
2 INJECTION, SOLUTION INTRAMUSCULAR; INTRAVENOUS
Status: DISCONTINUED | OUTPATIENT
Start: 2018-11-07 | End: 2018-11-08 | Stop reason: HOSPADM

## 2018-11-07 RX ORDER — HYDROCODONE BITARTRATE AND ACETAMINOPHEN 7.5; 325 MG/1; MG/1
2 TABLET ORAL
Status: DISCONTINUED | OUTPATIENT
Start: 2018-11-07 | End: 2018-11-08 | Stop reason: HOSPADM

## 2018-11-07 RX ADMIN — IBUPROFEN 800 MG: 800 TABLET ORAL at 17:20

## 2018-11-07 RX ADMIN — DOCUSATE SODIUM 100 MG: 100 CAPSULE, LIQUID FILLED ORAL at 10:43

## 2018-11-07 RX ADMIN — SIMETHICONE CHEW TAB 80 MG 80 MG: 80 TABLET ORAL at 17:13

## 2018-11-07 RX ADMIN — HYDROCODONE BITARTRATE AND ACETAMINOPHEN 1 TABLET: 7.5; 325 TABLET ORAL at 15:13

## 2018-11-07 RX ADMIN — HYDROCODONE BITARTRATE AND ACETAMINOPHEN 2 TABLET: 7.5; 325 TABLET ORAL at 19:06

## 2018-11-07 RX ADMIN — IBUPROFEN 800 MG: 800 TABLET ORAL at 23:17

## 2018-11-07 RX ADMIN — HYDROCODONE BITARTRATE AND ACETAMINOPHEN 1 TABLET: 7.5; 325 TABLET ORAL at 14:13

## 2018-11-07 RX ADMIN — SIMETHICONE CHEW TAB 80 MG 80 MG: 80 TABLET ORAL at 23:19

## 2018-11-07 RX ADMIN — ACETAMINOPHEN 1000 MG: 500 TABLET, FILM COATED ORAL at 05:06

## 2018-11-07 RX ADMIN — HYDROCODONE BITARTRATE AND ACETAMINOPHEN 2 TABLET: 7.5; 325 TABLET ORAL at 23:18

## 2018-11-07 RX ADMIN — KETOROLAC TROMETHAMINE 30 MG: 30 INJECTION, SOLUTION INTRAMUSCULAR at 10:42

## 2018-11-07 RX ADMIN — DOCUSATE SODIUM 100 MG: 100 CAPSULE, LIQUID FILLED ORAL at 17:13

## 2018-11-07 RX ADMIN — SIMETHICONE CHEW TAB 80 MG 80 MG: 80 TABLET ORAL at 10:43

## 2018-11-07 RX ADMIN — KETOROLAC TROMETHAMINE 30 MG: 30 INJECTION, SOLUTION INTRAMUSCULAR at 05:07

## 2018-11-07 RX ADMIN — Medication 7 ML: at 10:45

## 2018-11-07 NOTE — LACTATION NOTE
This note was copied from a baby's chart. Infant mother called out requested formula to fed infant because \"she did not want to wake her  up\". This RN offered assistance with breast feeding and infant mothers denied. This RN offered syringe feeding infant to decrease nipple confusion. Syringe feeding denied and bottle nipple requested. Bottle was given to infants mother and education on bottle feeding was provided. Mother voiced understanding.

## 2018-11-07 NOTE — PROGRESS NOTES
Patient requested and received Oxycodone IR 5mg PO for pain. RN to reassess pain level. See flow sheet.

## 2018-11-07 NOTE — PROGRESS NOTES
Patient seen and reports pain well controlled, itching improved after medication, denies weakness, numbness or back pain. Patient reports no further concerns and is hemodynamically stable with resolved spinal block with no observed or reported complications. Instructed patient to call with any further questions or concerns.

## 2018-11-07 NOTE — PROGRESS NOTES
Post-Operative Day Number 1 Progress Note Patient doing well post-op day 1 from  delivery without significant complaints. Pain controlled on current medication. Voiding without difficulty, normal lochia. Per staff, patient and  sleeping and not awaken. Vitals:   
Patient Vitals for the past 8 hrs: 
 BP Temp Pulse Resp SpO2  
18 0200 111/62      
18 2315 97/45 98.5 °F (36.9 °C) 83 18 97 % Temp (24hrs), Av.2 °F (36.8 °C), Min:97.7 °F (36.5 °C), Max:98.5 °F (36.9 °C) Vital signs stable, afebrile. Exam:  Patient without distress. Abdomen soft, fundus firm at level of umbilicus, non tender. Incision dry and clean without erythema. Lower extremities are negative for swelling, cords or tenderness. Lab/Data Review: CBC: No results found for: WBC, HGB, HGBEXT, HCT, HCTEXT, PLT, PLTEXT, HGBEXT, HCTEXT, PLTEXT Assessment and Plan:  Patient appears to be having uncomplicated post- course. Continue routine post-op care and maternal education.

## 2018-11-07 NOTE — PROGRESS NOTES
Report of care received from, [facility], RN. Bedside report given, pt denies further needs at present time

## 2018-11-07 NOTE — PROGRESS NOTES
Shift assessment completed. See flow sheet. Questions are encouraged and answered with patient. Instructed patient  To call out with any needs. Patient denies needs at this time.

## 2018-11-07 NOTE — LACTATION NOTE
This note was copied from a baby's chart. In to see mom and infant for follow up. Mom had just tried baby at breast and was coming off of pumping. Helped mom use 1ml straight syringe to draw expressed breast milk into and give in babies mouth. Got 0.71 ml of mom's pumped milk. Grandma mentioned baby was having a hard time taking the slow flow bottle while mom was pumping and in 30 minutes baby had only taken about 8 mls and spit up half of it. Offered to try curve tip syringe to see if baby could take it any better/faster that way. Got 4mls down baby but did not do well coordinating using it. Brought in a volume feeder and RN showed mom and grandma how to use that with chin support as needed for future feeds. Baby seemed to do best with this method and better able to see exact mls baby takes. Mom very diligent in pumping after attempts q3. Encouraged her to use support in room to clean pump supplies and feed back EBM and supplementation. Mom has no further needs- baby burped and asleep. Lactation to follow up in am for discharge.

## 2018-11-08 VITALS
WEIGHT: 292 LBS | HEART RATE: 88 BPM | RESPIRATION RATE: 18 BRPM | TEMPERATURE: 98.2 F | SYSTOLIC BLOOD PRESSURE: 150 MMHG | BODY MASS INDEX: 48.65 KG/M2 | OXYGEN SATURATION: 98 % | HEIGHT: 65 IN | DIASTOLIC BLOOD PRESSURE: 86 MMHG

## 2018-11-08 PROCEDURE — 74011250637 HC RX REV CODE- 250/637: Performed by: OBSTETRICS & GYNECOLOGY

## 2018-11-08 RX ORDER — IBUPROFEN 600 MG/1
600 TABLET ORAL
Qty: 20 TAB | Refills: 1 | Status: SHIPPED | OUTPATIENT
Start: 2018-11-08 | End: 2019-03-28

## 2018-11-08 RX ORDER — HYDROCODONE BITARTRATE AND ACETAMINOPHEN 7.5; 325 MG/1; MG/1
1-2 TABLET ORAL
Qty: 30 TAB | Refills: 0 | Status: SHIPPED | OUTPATIENT
Start: 2018-11-08 | End: 2019-03-28

## 2018-11-08 RX ADMIN — IBUPROFEN 800 MG: 800 TABLET ORAL at 05:32

## 2018-11-08 RX ADMIN — HYDROCODONE BITARTRATE AND ACETAMINOPHEN 1 TABLET: 7.5; 325 TABLET ORAL at 12:07

## 2018-11-08 RX ADMIN — DOCUSATE SODIUM 100 MG: 100 CAPSULE, LIQUID FILLED ORAL at 08:12

## 2018-11-08 RX ADMIN — HYDROCODONE BITARTRATE AND ACETAMINOPHEN 2 TABLET: 7.5; 325 TABLET ORAL at 03:35

## 2018-11-08 RX ADMIN — SIMETHICONE CHEW TAB 80 MG 80 MG: 80 TABLET ORAL at 12:07

## 2018-11-08 RX ADMIN — IBUPROFEN 800 MG: 800 TABLET ORAL at 12:06

## 2018-11-08 RX ADMIN — HYDROCODONE BITARTRATE AND ACETAMINOPHEN 2 TABLET: 7.5; 325 TABLET ORAL at 08:11

## 2018-11-08 RX ADMIN — SIMETHICONE CHEW TAB 80 MG 80 MG: 80 TABLET ORAL at 08:12

## 2018-11-08 NOTE — LACTATION NOTE
This note was copied from a baby's chart. Individualized Feeding Plan for Breastfeeding Lactation Services [phone] As much as possible, hold your baby on your chest so babys bare skin is against your bare skin with a blanket covering babys back, especially 30 minutes before it is time for baby to eat. Watch for early feeding cues such as, licking lips, sucking motions, rooting, hands to mouth. Crying is a late feeding cue. Feed your baby at least 8 times in 24 hours, or more if your baby is showing feeding cues. If baby is sleepy put baby skin to skin and watch for hunger cues. To rouse baby: unwrap, undress, massage hands, feet, & back, change diaper, gently change babys position from lying to sitting. 15-20 minutes on the first breast of active breastfeeding is considered a good feeding. Good, active breastfeeding is when baby is alert, tugging the nipple, their ear may move, and you can hear swallows. Allow baby to finish the first side before changing sides. Sleeping at the breast or only brief, light sucks should not be considered a good, full breastfeed. At each feeding: 
__x__1. Do Suck Practice on finger before each feeding until sucking pattern is smooth. Try using index finger. Nail down towards tongue. __x__2. Hand Express for a few minutes prior to latching to help start milk flow. __x__3. Baby needs to NURSE WELL x 15-20 minutes on at least first breast, burp and offer 2nd breast at every feeding. If no sustained latch only attempt at breast for 10 minutes. If baby does not latch on and feed well on at least one side, you should:  
__x__4. Double pump for 15 minutes with breast massage and compression. Hand express for an additional 2-3 minutes per side. Pump after each feeding attempt or poor feeding, up to 8 times per day. If you are not putting baby to the breast you need to pump 8 times a day. Pump every at least every 3 hours. __x__5. Give baby all of the breast milk you obtain using a straight syringe or  curved syringe. If baby does NOT have enough wet and dirty diapers per day, is jaundiced/lethargic, or has significant weight loss AND you do NOT pump enough milk for each feeding (per volume listed below), formula supplementation may need to be used. Call lactation department /pediatrician if you have concerns. AVERAGE INTAKES OF COLOSTRUM BY HEALTHY  INFANTS: 
Time  Day Intake (ml/feed)  Based on 8 feedings per day. 1st 24 hrs  1 2-10 ml 
24-48 hrs  2 15-30 ml 
48-72 hrs  3 30-45 ml (1-1.5 oz) 72-96 hrs  4 45-60 ml (1.5-2oz) 5-6      60-75 ml (2-2.5oz) 7          75-90 ml (2.5-3oz) By day 7, baby will need 75 ml or 2.5 oz at each feeding based on 8 feedings per day & babys weight. (1oz = 30ml). Total milk volume needed in 24 hours by Day 7 is 20.3 oz per day based on baby's birthweight of 7.10. The more often baby eats, the less volume they need per feeding. If baby is eating more often than the minimum of 8 times per day, they may take less per feeding. Use feeding plan until follow up with pediatrician. Continue to attempt at the breast for most feeds. Pump every 3 hours if no latch. Give all pumped colostrum/breastmilk at each feeding.

## 2018-11-08 NOTE — DISCHARGE SUMMARY
Obstetrical Discharge Summary     Name: Roseann Lambert MRN: [id number]  SSN: [id number]    YOB: 1990  Age: 33 y.o. Sex: female      Allergies: Patient has no known allergies. Admit Date: 2018    Discharge Date: 2018     Admitting Physician: Sonia Gandhi MD     Attending Physician:  Valentina Yen MD     * Admission Diagnoses: LABOR  Normal labor    * Discharge Diagnoses:   Information for the patient's :  Milka Jack [[id number]]   Delivery of a 3.45 kg male infant via , Low Transverse on 2018 at 2:12 AM  by . Apgars were 8 and 9. Additional Diagnoses:   Hospital Problems as of 2018 Date Reviewed: 2018          Codes Class Noted - Resolved POA    Delivery of pregnancy by  section ICD-10-CM: O82  ICD-9-CM: 669.70  2018 - Present Unknown        * (Principal) Normal labor ICD-10-CM: O80, Z37.9  ICD-9-CM: 817  2018 - Present Unknown             Lab Results   Component Value Date/Time    ABO/Rh(D) A POSITIVE 2018 05:07 AM    There is no immunization history for the selected administration types on file for this patient. * Procedures:   Procedure(s):   SECTION           * Discharge Condition: good and stable    * Hospital Course: Normal hospital course following the delivery. * Disposition: Home    Discharge Medications:   Current Discharge Medication List      START taking these medications    Details   HYDROcodone-acetaminophen (NORCO) 7.5-325 mg per tablet Take 1-2 Tabs by mouth every six (6) hours as needed. Max Daily Amount: 8 Tabs. Qty: 30 Tab, Refills: 0    Associated Diagnoses: Post-op pain      ibuprofen (MOTRIN) 600 mg tablet Take 1 Tab by mouth every six (6) hours as needed. Qty: 20 Tab, Refills: 1    Associated Diagnoses: Post-op pain      ferrous sulfate (SLOW FE) 142 mg (45 mg iron) ER tablet Take 1 Tab by mouth Daily (before breakfast).   Qty: 60 Tab, Refills: 1         CONTINUE these medications which have NOT CHANGED    Details   PNV66-Iron Fumarate-FA-DSS-DHA 26-1.2- mg cap Take 1 Tab by mouth.      escitalopram oxalate (LEXAPRO) 10 mg tablet Take 1 Tab by mouth daily. Qty: 90 Tab, Refills: 3      LORazepam (ATIVAN) 0.5 mg tablet Take 1 Tab by mouth every eight (8) hours as needed for Anxiety. Max Daily Amount: 1.5 mg.  Qty: 20 Tab, Refills: 0      scopolamine (TRANSDERM-SCOP) 1.5 mg (1 mg over 3 days) pt3d 1 Patch by TransDERmal route every seventy-two (72) hours. Qty: 4 Patch, Refills: 0      ipratropium (ATROVENT) 0.06 % nasal spray 2 Sprays by Both Nostrils route every eight (8) hours as needed for Rhinitis. Qty: 15 mL, Refills: 0    Associated Diagnoses: Subacute nonsuppurative otitis media of left ear; Acute maxillary sinusitis, recurrence not specified         STOP taking these medications       ethinyl estradiol-etonogestrel (NUVARING) 0.12-0.015 mg/24 hr vaginal ring Comments:   Reason for Stopping:               * Follow-up Care/Patient Instructions:   Activity: Activity as tolerated  Diet: Regular Diet  Wound Care: Keep wound clean and dry    Follow-up Information    None          Signed By:  Shea Garcia MD     November 8, 2018

## 2018-11-08 NOTE — PROGRESS NOTES
Referral made to [facility]  home visit program. 
 
Melvenia Polanco, [address]

## 2018-11-08 NOTE — PROGRESS NOTES
Post-Operative Day Number 2 Progress Note Patient doing well post-op day 2 from  delivery without significant complaints. Pain controlled on current medication. Voiding without difficulty, normal lochia. Vitals:   
Patient Vitals for the past 8 hrs: 
 BP Temp Pulse Resp SpO2  
18 0704 150/86 98.2 °F (36.8 °C) 88 18 98 % Temp (24hrs), Av.8 °F (36.6 °C), Min:97.4 °F (36.3 °C), Max:98.2 °F (36.8 °C) Vital signs stable, afebrile. Exam:  Patient without distress. Abdomen soft, fundus firm at level of umbilicus, non tender. Incision dry and clean without erythema. Lower extremities are negative for swelling, cords or tenderness. Lab/Data Review: CBC: No results found for: WBC, HGB, HGBEXT, HCT, HCTEXT, PLT, PLTEXT, HGBEXT, HCTEXT, PLTEXT Assessment and Plan:  Patient appears to be having uncomplicated post- course. Continue routine post-op care and maternal education.

## 2018-11-08 NOTE — LACTATION NOTE
This note was copied from a baby's chart. Lactation visit with 2nd time mom, mom states baby is starting to latch some at breast.  Mom is attempting at breast, pumping and supplementing using CTS. Mom states infant latched for 5 minutes this morning. Mom would like an early discharge today. Reviewed feeding plan with parents, verbalized understanding. Reviewed discharge teaching including sore nipples, engorgement and remedies and following feeding plan until follow up with [facility], verbalized understanding.

## 2018-11-08 NOTE — LACTATION NOTE

## 2018-11-08 NOTE — DISCHARGE INSTRUCTIONS
Section: What to Expect at [address] [place] [address]    A  section, or , is surgery to deliver your baby through a cut, called an incision, that the doctor makes in your lower belly and uterus. You may have some pain in your lower belly and need pain medicine for 1 to 2 weeks. You can expect some vaginal bleeding for several weeks. You will probably need about 6 weeks to fully recover. It is important to take it easy while the incision is healing. Avoid heavy lifting, strenuous activities, or exercises that strain the belly muscles while you are recovering. Ask a family member or friend for help with housework, cooking, and shopping. This care sheet gives you a general idea about how long it will take for you to recover. But each person recovers at a different pace. Follow the steps below to get better as quickly as possible. How can you care for yourself at home? Activity    · Rest when you feel tired. Getting enough sleep will help you recover.     · Try to walk each day. Start by walking a little more than you did the day before. Bit by bit, increase the amount you walk. Walking boosts blood flow and helps prevent pneumonia, constipation, and blood clots.     · Avoid strenuous activities, such as bicycle riding, jogging, weightlifting, and aerobic exercise, for 6 weeks or until your doctor says it is okay.     · Until your doctor says it is okay, do not lift anything heavier than your baby.     · Do not do sit-ups or other exercises that strain the belly muscles for 6 weeks or until your doctor says it is okay.     · Hold a pillow over your incision when you cough or take deep breaths. This will support your belly and decrease your pain.     · You may shower as usual. Pat the incision dry when you are done.     · You will have some vaginal bleeding. Wear sanitary pads.  Do not douche or use tampons until your doctor says it is okay.     · Ask your doctor when you can drive again.     · You will probably need to take at least 6 weeks off work. It depends on the type of work you do and how you feel.     · Ask your doctor when it is okay for you to have sex. Diet    · You can eat your normal diet. If your stomach is upset, try bland, low-fat foods like plain rice, broiled chicken, toast, and yogurt.     · Drink plenty of fluids (unless your doctor tells you not to).     · You may notice that your bowel movements are not regular right after your surgery. This is common. Try to avoid constipation and straining with bowel movements. You may want to take a fiber supplement every day. If you have not had a bowel movement after a couple of days, ask your doctor about taking a mild laxative.     · If you are breastfeeding, limit alcohol. Alcohol can cause a lack of energy and other health problems for the baby when a breastfeeding woman drinks heavily. It can also get in the way of a mom's ability to feed her baby or to care for the child in other ways. There isn't a lot of research about exactly how much alcohol can harm a baby. Having no alcohol is the safest choice for your baby. If you choose to have a drink now and then, have only one drink, and limit the number of occasions that you have a drink. Wait to breastfeed at least 2 hours after you have a drink to reduce the amount of alcohol the baby may get in the milk. Medicines    · Your doctor will tell you if and when you can restart your medicines. He or she will also give you instructions about taking any new medicines.     · If you take blood thinners, such as warfarin (Coumadin), clopidogrel (Plavix), or aspirin, be sure to talk to your doctor. He or she will tell you if and when to start taking those medicines again. Make sure that you understand exactly what your doctor wants you to do.     · Take pain medicines exactly as directed. ? If the doctor gave you a prescription medicine for pain, take it as prescribed.   ? If you are not taking a prescription pain medicine, ask your doctor if you can take an over-the-counter medicine.     · If you think your pain medicine is making you sick to your stomach:  ? Take your medicine after meals (unless your doctor has told you not to). ? Ask your doctor for a different pain medicine.     · If your doctor prescribed antibiotics, take them as directed. Do not stop taking them just because you feel better. You need to take the full course of antibiotics. Incision care    · If you have strips of tape on the incision, leave the tape on for a week or until it falls off.     · Wash the area daily with warm, soapy water, and pat it dry. Don't use hydrogen peroxide or alcohol, which can slow healing. You may cover the area with a gauze bandage if it weeps or rubs against clothing. Change the bandage every day.     · Keep the area clean and dry. Other instructions    · If you breastfeed your baby, you may be more comfortable while you are healing if you place the baby so that he or she is not resting on your belly. Try tucking your baby under your arm, with his or her body along the side you will be feeding on. Support your baby's upper body with your arm. With that hand you can control your baby's head to bring his or her mouth to your breast. This is sometimes called the football hold. Follow-up care is a key part of your treatment and safety. Be sure to make and go to all appointments, and call your doctor if you are having problems. It's also a good idea to know your test results and keep a list of the medicines you take. When should you call for help? Call 911 anytime you think you may need emergency care.  For example, call if:    · You passed out (lost consciousness).     · You have chest pain, are short of breath, or cough up blood.    Call your doctor now or seek immediate medical care if:    · You have pain that does not get better after you take pain medicine.     · You have severe vaginal bleeding.     · You are dizzy or lightheaded, or you feel like you may faint.     · You have new or worse pain in your belly or pelvis.     · You have loose stitches, or your incision comes open.     · You have symptoms of infection, such as:  ? Increased pain, swelling, warmth, or redness. ? Red streaks leading from the incision. ? Pus draining from the incision. ? A fever.     · You have symptoms of a blood clot in your leg (called a deep vein thrombosis), such as:  ? Pain in your calf, back of the knee, thigh, or groin. ? Redness and swelling in your leg or groin.    Watch closely for changes in your health, and be sure to contact your doctor if:    · You do not get better as expected. Where can you learn more? Go to http://amna-eulalia.info/. Enter M806 in the search box to learn more about \" Section: What to Expect at Home. \"  Current as of: 2017  Content Version: 11.8  © 4171-5448 [employer]wise, Incorporated. Care instructions adapted under license by [employer] (which disclaims liability or warranty for this information). If you have questions about a medical condition or this instruction, always ask your healthcare professional. [address] any warranty or liability for your use of this information.

## 2018-11-08 NOTE — PROGRESS NOTES
Chart reviewed - patient with history of depression.  met with family and provided education/pamphlet on [facility] [place] [facility]. Family would like to receive home visit. Referral will be made at discharge. Patient confirms history of depression which she referred to as \"bad mood swings. \"  She states that she hasn't taken any medication in 2 years as she was trying to conceive. Patient was previously on Lexapro and Ativan. Patient is not interested in resuming medication at this time. Instead, she would like to see how she feels postpartum, but she is open to resuming medication if needed. Patient/ both agree that patient is \"good about recognizing the signs of depression\" and responding appropriately. Patient states that she has a large support group of local family/friends.  provided education and literature on support available thru [employer] ([employer]). [employer] Warmline:  4-531-178-4PPD (1274). WWW. POSTPARTUM. NET Family was informed of signs/symptoms, forms of intervention (medication, counseling, education), and resources (local coordinators available telephonically, monthly support group in [place], weekly \"chat with expert\" phone sessions). Additionally, patient was provided with [facility] Checklist.\" Discussed importance of self-care and accepting help when offered. Family was encouraged to contact me with any questions/needs -  contact information provided. John Izquierdo, [address] [facility]

## 2019-03-28 PROBLEM — E66.01 OBESITY, MORBID (HCC): Status: ACTIVE | Noted: 2019-03-28

## 2019-05-15 ENCOUNTER — APPOINTMENT (RX ONLY)
Dept: URBAN - METROPOLITAN AREA CLINIC 349 | Facility: CLINIC | Age: 29
Setting detail: DERMATOLOGY
End: 2019-05-15

## 2019-05-15 DIAGNOSIS — L30.4 ERYTHEMA INTERTRIGO: ICD-10-CM

## 2019-05-15 PROBLEM — F41.9 ANXIETY DISORDER, UNSPECIFIED: Status: ACTIVE | Noted: 2019-05-15

## 2019-05-15 PROCEDURE — ? OTHER

## 2019-05-15 PROCEDURE — 99242 OFF/OP CONSLTJ NEW/EST SF 20: CPT

## 2019-05-15 PROCEDURE — ? COUNSELING

## 2019-05-15 PROCEDURE — ? TREATMENT REGIMEN

## 2019-05-15 PROCEDURE — ? PRESCRIPTION

## 2019-05-15 RX ORDER — IODOQUINOL, HYDROCORTISONE ACETATE AND ALOE VERA LEAF 10; 20; 10 MG/G; MG/G; MG/G
GEL TOPICAL
Qty: 1 | Refills: 2 | Status: ERX | COMMUNITY
Start: 2019-05-15

## 2019-05-15 RX ADMIN — IODOQUINOL, HYDROCORTISONE ACETATE AND ALOE VERA LEAF: 10; 20; 10 GEL TOPICAL at 18:42

## 2019-05-15 ASSESSMENT — LOCATION SIMPLE DESCRIPTION DERM
LOCATION SIMPLE: RIGHT AXILLARY VAULT
LOCATION SIMPLE: LEFT UPPER ARM

## 2019-05-15 ASSESSMENT — LOCATION ZONE DERM
LOCATION ZONE: AXILLAE
LOCATION ZONE: ARM

## 2019-05-15 ASSESSMENT — LOCATION DETAILED DESCRIPTION DERM
LOCATION DETAILED: LEFT ANTERIOR MEDIAL PROXIMAL UPPER ARM
LOCATION DETAILED: RIGHT AXILLARY VAULT

## 2019-05-15 NOTE — PROCEDURE: TREATMENT REGIMEN
Detail Level: Zone
Initiate Treatment: Alcortin gel apply to affected area twice daily as needed ([place])

## 2019-05-15 NOTE — PROCEDURE: OTHER
Other (Free Text): If area fails to resolve then we will consider doing a biopsy or culture.
Detail Level: Detailed
Note Text (......Xxx Chief Complaint.): This diagnosis correlates with the

## 2019-05-15 NOTE — HPI: RASH
How Severe Is Your Rash?: mild
Is This A New Presentation, Or A Follow-Up?: Rash
Additional History: Patient noticed a rash several months ago under her arms. Patient states it’s very itchy and comes and goes. Patient was put on Nystatin and was told it was a yeast. Patient is not flared today but the area is itching. Patient noticed the rash about three months having having a baby. Patient states she’s changed her diet, tried probiotics, and changed detergents and nothing has helped. Patient is thinking it’s stress related.

## 2019-06-18 ENCOUNTER — APPOINTMENT (RX ONLY)
Dept: URBAN - METROPOLITAN AREA CLINIC 349 | Facility: CLINIC | Age: 29
Setting detail: DERMATOLOGY
End: 2019-06-18

## 2019-06-18 DIAGNOSIS — L91.8 OTHER HYPERTROPHIC DISORDERS OF THE SKIN: ICD-10-CM

## 2019-06-18 DIAGNOSIS — L30.4 ERYTHEMA INTERTRIGO: ICD-10-CM | Status: IMPROVED

## 2019-06-18 PROCEDURE — ? SKIN TAG REMOVAL

## 2019-06-18 PROCEDURE — ? OTHER

## 2019-06-18 PROCEDURE — 99213 OFFICE O/P EST LOW 20 MIN: CPT | Mod: 25

## 2019-06-18 PROCEDURE — ? TREATMENT REGIMEN

## 2019-06-18 PROCEDURE — ? COUNSELING

## 2019-06-18 PROCEDURE — ? PRESCRIPTION

## 2019-06-18 PROCEDURE — 11200 RMVL SKIN TAGS UP TO&INC 15: CPT

## 2019-06-18 RX ORDER — IODOQUINOL, HYDROCORTISONE ACETATE AND ALOE VERA LEAF 10; 20; 10 MG/G; MG/G; MG/G
GEL TOPICAL
Qty: 1 | Refills: 2 | Status: ERX

## 2019-06-18 ASSESSMENT — LOCATION DETAILED DESCRIPTION DERM
LOCATION DETAILED: RIGHT INFERIOR LATERAL NECK
LOCATION DETAILED: LEFT ANTERIOR MEDIAL PROXIMAL UPPER ARM
LOCATION DETAILED: RIGHT AXILLARY VAULT
LOCATION DETAILED: RIGHT SUPERIOR LATERAL NECK
LOCATION DETAILED: RIGHT AXILLARY TAIL OF BREAST

## 2019-06-18 ASSESSMENT — LOCATION SIMPLE DESCRIPTION DERM
LOCATION SIMPLE: RIGHT AXILLARY VAULT
LOCATION SIMPLE: LEFT UPPER ARM
LOCATION SIMPLE: RIGHT ANTERIOR NECK
LOCATION SIMPLE: RIGHT BREAST

## 2019-06-18 ASSESSMENT — LOCATION ZONE DERM
LOCATION ZONE: AXILLAE
LOCATION ZONE: TRUNK
LOCATION ZONE: NECK
LOCATION ZONE: ARM

## 2019-06-18 NOTE — PROCEDURE: TREATMENT REGIMEN
Initiate Treatment: Alcortin gel apply to affected area twice daily as needed ([place])
Samples Given: Alcortin gel
Detail Level: Zone

## 2019-06-18 NOTE — PROCEDURE: OTHER
Note Text (......Xxx Chief Complaint.): This diagnosis correlates with the
Other (Free Text): Patient states the pharmacy never called her and she never got the prescription. We will resend prescription and provide patient with some samples today. She states the samples did help and relieved the itchiness.
Detail Level: Detailed

## 2019-06-18 NOTE — PROCEDURE: SKIN TAG REMOVAL
Include Z78.9 (Other Specified Conditions Influencing Health Status) As An Associated Diagnosis?: No
Anesthesia Volume In Cc: 0.3
Consent: Written consent obtained and the risks of skin tag removal was reviewed with the patient including but not limited to bleeding, pigmentary change, infection, pain, and remote possibility of scarring.
Medical Necessity Clause: This procedure was medically necessary because the lesions that were treated were:
Add Associated Diagnoses If Applicable When Selecting Medical Necessity: Yes
Medical Necessity Information: It is in your best interest to select a reason for this procedure from the list below. All of these items fulfill various CMS LCD requirements except the new and changing color options.
Detail Level: Detailed

## 2020-03-26 ENCOUNTER — HOSPITAL ENCOUNTER (EMERGENCY)
Age: 30
Discharge: HOME | End: 2020-03-27
Attending: EMERGENCY MEDICINE
Payer: COMMERCIAL

## 2020-03-26 DIAGNOSIS — R55 SYNCOPE AND COLLAPSE: Primary | ICD-10-CM

## 2020-03-26 DIAGNOSIS — I95.1 ORTHOSTATIC HYPOTENSION: ICD-10-CM

## 2020-03-26 DIAGNOSIS — O03.9 SPONTANEOUS MISCARRIAGE: ICD-10-CM

## 2020-03-26 LAB
ALBUMIN SERPL-MCNC: 3.3 G/DL (ref 3.5–5)
ALBUMIN/GLOB SERPL: 0.9 {RATIO} (ref 1.2–3.5)
ALP SERPL-CCNC: 44 U/L (ref 50–130)
ALT SERPL-CCNC: 16 U/L (ref 12–65)
ANION GAP SERPL CALC-SCNC: 6 MMOL/L (ref 7–16)
AST SERPL-CCNC: 14 U/L (ref 15–37)
BASOPHILS # BLD: 0 K/UL (ref 0–0.2)
BASOPHILS NFR BLD: 0 % (ref 0–2)
BILIRUB SERPL-MCNC: 0.5 MG/DL (ref 0.2–1.1)
BUN SERPL-MCNC: 10 MG/DL (ref 6–23)
CALCIUM SERPL-MCNC: 9.2 MG/DL (ref 8.3–10.4)
CHLORIDE SERPL-SCNC: 109 MMOL/L (ref 98–107)
CO2 SERPL-SCNC: 25 MMOL/L (ref 21–32)
CREAT SERPL-MCNC: 0.83 MG/DL (ref 0.6–1)
DIFFERENTIAL METHOD BLD: ABNORMAL
EOSINOPHIL # BLD: 0.2 K/UL (ref 0–0.8)
EOSINOPHIL NFR BLD: 1 % (ref 0.5–7.8)
ERYTHROCYTE [DISTWIDTH] IN BLOOD BY AUTOMATED COUNT: 12.9 % (ref 11.9–14.6)
GLOBULIN SER CALC-MCNC: 3.6 G/DL (ref 2.3–3.5)
GLUCOSE SERPL-MCNC: 121 MG/DL (ref 65–100)
HCT VFR BLD AUTO: 35.1 % (ref 35.8–46.3)
HGB BLD-MCNC: 11.5 G/DL (ref 11.7–15.4)
IMM GRANULOCYTES # BLD AUTO: 0 K/UL (ref 0–0.5)
IMM GRANULOCYTES NFR BLD AUTO: 0 % (ref 0–5)
LYMPHOCYTES # BLD: 2.2 K/UL (ref 0.5–4.6)
LYMPHOCYTES NFR BLD: 16 % (ref 13–44)
MCH RBC QN AUTO: 29.3 PG (ref 26.1–32.9)
MCHC RBC AUTO-ENTMCNC: 32.8 G/DL (ref 31.4–35)
MCV RBC AUTO: 89.3 FL (ref 79.6–97.8)
MONOCYTES # BLD: 0.6 K/UL (ref 0.1–1.3)
MONOCYTES NFR BLD: 4 % (ref 4–12)
NEUTS SEG # BLD: 10.7 K/UL (ref 1.7–8.2)
NEUTS SEG NFR BLD: 78 % (ref 43–78)
NRBC # BLD: 0 K/UL (ref 0–0.2)
PLATELET # BLD AUTO: 287 K/UL (ref 150–450)
PMV BLD AUTO: 10.1 FL (ref 9.4–12.3)
POTASSIUM SERPL-SCNC: 3.6 MMOL/L (ref 3.5–5.1)
PROT SERPL-MCNC: 6.9 G/DL (ref 6.3–8.2)
RBC # BLD AUTO: 3.93 M/UL (ref 4.05–5.2)
SODIUM SERPL-SCNC: 140 MMOL/L (ref 136–145)
WBC # BLD AUTO: 13.8 K/UL (ref 4.3–11.1)

## 2020-03-26 PROCEDURE — 80053 COMPREHEN METABOLIC PANEL: CPT

## 2020-03-26 PROCEDURE — 96360 HYDRATION IV INFUSION INIT: CPT

## 2020-03-26 PROCEDURE — 51701 INSERT BLADDER CATHETER: CPT

## 2020-03-26 PROCEDURE — 85025 COMPLETE CBC W/AUTO DIFF WBC: CPT

## 2020-03-26 PROCEDURE — 93005 ELECTROCARDIOGRAM TRACING: CPT | Performed by: EMERGENCY MEDICINE

## 2020-03-26 PROCEDURE — 74011250636 HC RX REV CODE- 250/636: Performed by: EMERGENCY MEDICINE

## 2020-03-26 PROCEDURE — 99285 EMERGENCY DEPT VISIT HI MDM: CPT

## 2020-03-26 RX ADMIN — SODIUM CHLORIDE 1000 ML: 900 INJECTION, SOLUTION INTRAVENOUS at 23:47

## 2020-03-27 ENCOUNTER — APPOINTMENT (OUTPATIENT)
Dept: ULTRASOUND IMAGING | Age: 30
End: 2020-03-27
Attending: EMERGENCY MEDICINE
Payer: COMMERCIAL

## 2020-03-27 VITALS
HEART RATE: 82 BPM | SYSTOLIC BLOOD PRESSURE: 93 MMHG | TEMPERATURE: 98.3 F | OXYGEN SATURATION: 100 % | DIASTOLIC BLOOD PRESSURE: 54 MMHG | RESPIRATION RATE: 17 BRPM

## 2020-03-27 PROBLEM — R55 SYNCOPAL EPISODES: Status: ACTIVE | Noted: 2020-03-27

## 2020-03-27 PROBLEM — O03.4 INCOMPLETE SPONTANEOUS ABORTION: Status: ACTIVE | Noted: 2020-03-27

## 2020-03-27 LAB
APPEARANCE UR: ABNORMAL
ATRIAL RATE: 70 BPM
BILIRUB UR QL: ABNORMAL
CALCULATED P AXIS, ECG09: 65 DEGREES
CALCULATED R AXIS, ECG10: 74 DEGREES
CALCULATED T AXIS, ECG11: 54 DEGREES
COLOR UR: ABNORMAL
DIAGNOSIS, 93000: NORMAL
GLUCOSE UR STRIP.AUTO-MCNC: 100 MG/DL
HGB UR QL STRIP: ABNORMAL
KETONES UR QL STRIP.AUTO: 15 MG/DL
LEUKOCYTE ESTERASE UR QL STRIP.AUTO: ABNORMAL
NITRITE UR QL STRIP.AUTO: POSITIVE
P-R INTERVAL, ECG05: 188 MS
PH UR STRIP: 8.5 [PH] (ref 5–9)
PROT UR STRIP-MCNC: ABNORMAL MG/DL
Q-T INTERVAL, ECG07: 382 MS
QRS DURATION, ECG06: 86 MS
QTC CALCULATION (BEZET), ECG08: 412 MS
SP GR UR REFRACTOMETRY: 1.01 (ref 1–1.02)
UROBILINOGEN UR QL STRIP.AUTO: 2 EU/DL (ref 0.2–1)
VENTRICULAR RATE, ECG03: 70 BPM

## 2020-03-27 PROCEDURE — 74011250637 HC RX REV CODE- 250/637: Performed by: OBSTETRICS & GYNECOLOGY

## 2020-03-27 PROCEDURE — 74011250636 HC RX REV CODE- 250/636: Performed by: EMERGENCY MEDICINE

## 2020-03-27 PROCEDURE — 74011250636 HC RX REV CODE- 250/636

## 2020-03-27 PROCEDURE — 76817 TRANSVAGINAL US OBSTETRIC: CPT

## 2020-03-27 PROCEDURE — 81003 URINALYSIS AUTO W/O SCOPE: CPT

## 2020-03-27 PROCEDURE — 88305 TISSUE EXAM BY PATHOLOGIST: CPT

## 2020-03-27 RX ORDER — SODIUM CHLORIDE 9 MG/ML
1000 INJECTION, SOLUTION INTRAVENOUS ONCE
Status: COMPLETED | OUTPATIENT
Start: 2020-03-27 | End: 2020-03-27

## 2020-03-27 RX ORDER — MORPHINE SULFATE 4 MG/ML
4 INJECTION INTRAVENOUS
Status: DISCONTINUED | OUTPATIENT
Start: 2020-03-27 | End: 2020-03-27 | Stop reason: HOSPADM

## 2020-03-27 RX ORDER — MISOPROSTOL 200 UG/1
600 TABLET ORAL ONCE
Status: DISCONTINUED | OUTPATIENT
Start: 2020-03-27 | End: 2020-03-27 | Stop reason: DRUGHIGH

## 2020-03-27 RX ORDER — MISOPROSTOL 200 UG/1
400 TABLET ORAL ONCE
Status: COMPLETED | OUTPATIENT
Start: 2020-03-27 | End: 2020-03-27

## 2020-03-27 RX ORDER — ONDANSETRON 2 MG/ML
4 INJECTION INTRAMUSCULAR; INTRAVENOUS
Status: DISCONTINUED | OUTPATIENT
Start: 2020-03-27 | End: 2020-03-27 | Stop reason: HOSPADM

## 2020-03-27 RX ADMIN — SODIUM CHLORIDE 1000 ML: 900 INJECTION, SOLUTION INTRAVENOUS at 02:42

## 2020-03-27 RX ADMIN — MISOPROSTOL 400 MCG: 200 TABLET ORAL at 01:44

## 2020-03-27 RX ADMIN — SODIUM CHLORIDE 1000 ML: 900 INJECTION, SOLUTION INTRAVENOUS at 00:12

## 2020-03-27 NOTE — ED NOTES
Dr. Mallory Beatty spoke with Dr. Christy Frost who looked at the ultrasound the ultrasound results and visited with the patient. Patient was markedly improved compared to what Dr. Mallory Beatty had witnessed with the orthostatic hypotension. Patient and Dr. Christy Frost discussed admission and the patient would prefer to be sent home as she has a 12month-old child to take care of and does not want to be exposed to additional nosocomial infections. .  Dr. Christy Frost agreed the patient looked well enough to go home. Evaluation: Patient currently is not tachycardic tachypneic or hypoxic blood pressures been maintaining 110/60 patient has no more orthostatic symptoms. Bleeding seems to have peaked and is resolving. Patient states that she is ready to go home. She is again offered admission if she would like to stay she would like to try to get home. She was instructed to return if bleeding increases or she has any more feelings of lightheadedness. She is to get up slowly and she is not to drive for several days.

## 2020-03-27 NOTE — ED NOTES
I have reviewed discharge instructions with the patient. The patient verbalized understanding. Patient left ED via Discharge Method: wheelchair to Home with spouse. Opportunity for questions and clarification provided. Patient given 0 scripts.

## 2020-03-27 NOTE — ED NOTES
Pt became very hypotensive during orthostatic vital signs.  Pt return to the the bed and Dr Guera Cotto made aware and order written

## 2020-03-27 NOTE — CONSULTS
Gynecology History and Physical    Name: Hector Diaz MRN: [id number] SSN: [id number]    YOB: 1990  Age: 30 y.o. Sex: female       Subjective:      Chief complaint:  Bleeding and syncopal episodes; sugar Martínez is a 30 y.o.  female with a history of recent diagnosis of a missed AB around 11 weeks. . Pt had been seen in her OB's office earlier today with the diagnosis of missed AB. She then had cramping and bleeding. She came to the ED for 2 near syncopal episodes. Pt reports cramping has now resolved. OB History        1    Para   1    Term   1            AB        Living   1       SAB        TAB        Ectopic        Molar        Multiple   0    Live Births   1              Past Medical History:   Diagnosis Date    Allergic rhinitis     Infertility, female     Polycystic disease, ovaries     Psychiatric problem     depression-not on medication during pregnancy     Past Surgical History:   Procedure Laterality Date    HX HEENT      tonsils and adenoids    HX OTHER SURGICAL      tonsils and adenoids removed     Social History     Occupational History    Not on file   Tobacco Use    Smoking status: Current Every Day Smoker    Smokeless tobacco: Never Used    Tobacco comment: Vaping occasionally   Substance and Sexual Activity    Alcohol use: No     Comment: 8 to 10 glasses of wine per week    Drug use: No    Sexual activity: Not on file     Family History   Problem Relation Age of Onset    Diabetes Mother     Cancer Mother     Hypertension Father     Diabetes Father     Cancer Maternal Aunt     Cancer Maternal Grandmother     Diabetes Maternal Grandmother     Hypertension Maternal Grandfather         No Known Allergies  Prior to Admission medications    Medication Sig Start Date End Date Taking? Authorizing Provider   ethinyl estradiol-etonogestrel (NUVARING) 0.12-0.015 mg/24 hr vaginal ring Insert 1 Each into vagina.     Provider, Historical   permethrin (ELIMITE) 5 % topical cream apply sparingly as directed 3/28/19   Madalynn Oppenheim D, NP   ferrous sulfate (SLOW FE) 142 mg (45 mg iron) ER tablet Take 1 Tab by mouth Daily (before breakfast). 11/8/18   Ethan Okeefe MD   escitalopram oxalate (LEXAPRO) 10 mg tablet Take 1 Tab by mouth daily. 2/5/16   Jose Zhang MD        Review of Systems  a 12 point review of systems was negative except as written in HPI    Objective:     Vitals:    03/26/20 2326 03/26/20 2328 03/27/20 0141 03/27/20 0148   BP: (!) 51/28 100/56 112/57    Pulse: 77 79  78   Resp: 20 24  15   Temp:       SpO2: 100% 100%  98%       Physical Exam:  Patient without distress. Awake , alert   Heart: Regular rate and rhythm  Lung: clear to auscultation throughout lung fields, no wheezes, no rales, no rhonchi and normal respiratory effort  Back: costovertebral angle tenderness absent  Abdomen: soft, nontender  External Genitalia: normal general appearance  Cervix: noted to have products of conception in cervical os- gently removed, open 1 cm, no current bleeding  Psych: calm, oriented, appropriate    [facility]    Result Date: 3/27/2020  IMPRESSION:  Heterogeneous endometrium but no evidence of retained products of conception. Recent Results (from the past 12 hour(s))   CBC WITH AUTOMATED DIFF    Collection Time: 03/26/20 10:20 PM   Result Value Ref Range    WBC 13.8 (H) 4.3 - 11.1 K/uL    RBC 3.93 (L) 4.05 - 5.2 M/uL    HGB 11.5 (L) 11.7 - 15.4 g/dL    HCT 35.1 (L) 35.8 - 46.3 %    MCV 89.3 79.6 - 97.8 FL    MCH 29.3 26.1 - 32.9 PG    MCHC 32.8 31.4 - 35.0 g/dL    RDW 12.9 11.9 - 14.6 %    PLATELET 000 133 - 100 K/uL    MPV 10.1 9.4 - 12.3 FL    ABSOLUTE NRBC 0.00 0.0 - 0.2 K/uL    DF AUTOMATED      NEUTROPHILS 78 43 - 78 %    LYMPHOCYTES 16 13 - 44 %    MONOCYTES 4 4.0 - 12.0 %    EOSINOPHILS 1 0.5 - 7.8 %    BASOPHILS 0 0.0 - 2.0 %    IMMATURE GRANULOCYTES 0 0.0 - 5.0 %    ABS.  NEUTROPHILS 10.7 (H) 1.7 - 8.2 K/UL    ABS. LYMPHOCYTES 2.2 0.5 - 4.6 K/UL    ABS. MONOCYTES 0.6 0.1 - 1.3 K/UL    ABS. EOSINOPHILS 0.2 0.0 - 0.8 K/UL    ABS. BASOPHILS 0.0 0.0 - 0.2 K/UL    ABS. IMM. GRANS. 0.0 0.0 - 0.5 K/UL   METABOLIC PANEL, COMPREHENSIVE    Collection Time: 20 10:20 PM   Result Value Ref Range    Sodium 140 136 - 145 mmol/L    Potassium 3.6 3.5 - 5.1 mmol/L    Chloride 109 (H) 98 - 107 mmol/L    CO2 25 21 - 32 mmol/L    Anion gap 6 (L) 7 - 16 mmol/L    Glucose 121 (H) 65 - 100 mg/dL    BUN 10 6 - 23 MG/DL    Creatinine 0.83 0.6 - 1.0 MG/DL    GFR est AA >60 >60 ml/min/1.73m2    GFR est non-AA >60 >60 ml/min/1.73m2    Calcium 9.2 8.3 - 10.4 MG/DL    Bilirubin, total 0.5 0.2 - 1.1 MG/DL    ALT (SGPT) 16 12 - 65 U/L    AST (SGOT) 14 (L) 15 - 37 U/L    Alk.  phosphatase 44 (L) 50 - 130 U/L    Protein, total 6.9 6.3 - 8.2 g/dL    Albumin 3.3 (L) 3.5 - 5.0 g/dL    Globulin 3.6 (H) 2.3 - 3.5 g/dL    A-G Ratio 0.9 (L) 1.2 - 3.5     URINALYSIS W/ RFLX MICROSCOPIC    Collection Time: 20 12:12 AM   Result Value Ref Range    Color RED      Appearance CLOUDY      Specific gravity 1.015 1.001 - 1.023      pH (UA) 8.5 5.0 - 9.0      Protein GREATER THAN/EQUAL  (A) NEG mg/dL    Glucose 100 (A) NEG mg/dL    Ketone 15 (A) NEG mg/dL    Bilirubin LARGE (A) NEG      Blood LARGE (A) NEG      Urobilinogen 2.0 (H) 0.2 - 1.0 EU/dL    Nitrites POSITIVE (A) NEG      Leukocyte Esterase LARGE (A) NEG         Assessment/Plan:      29 yo  with   Incomplete AB- completed while in ED  Near syncopy - improved with iv hydration  Offered admission with monitoring overnight- pt would like to go home- feeling much better  Specimen to pathology  F/u with [facility]

## 2020-03-27 NOTE — DISCHARGE INSTRUCTIONS
Patient Education        Lightheadedness or Faintness: Care Instructions  Your Care Instructions  Lightheadedness is a feeling that you are about to faint or \"pass out. \" You do not feel as if you or your surroundings are moving. It is different from vertigo, which is the feeling that you or things around you are spinning or tilting. Lightheadedness usually goes away or gets better when you lie down. If lightheadedness gets worse, it can lead to a fainting spell. It is common to feel lightheaded from time to time. Lightheadedness usually is not caused by a serious problem. It often is caused by a short-lasting drop in blood pressure and blood flow to your head that occurs when you get up too quickly from a seated or lying position. Follow-up care is a key part of your treatment and safety. Be sure to make and go to all appointments, and call your doctor if you are having problems. It's also a good idea to know your test results and keep a list of the medicines you take. How can you care for yourself at home? · Lie down for 1 or 2 minutes when you feel lightheaded. After lying down, sit up slowly and remain sitting for 1 to 2 minutes before slowly standing up. · Avoid movements, positions, or activities that have made you lightheaded in the past.  · Get plenty of rest, especially if you have a cold or flu, which can cause lightheadedness. · Make sure you drink plenty of fluids, especially if you have a fever or have been sweating. · Do not drive or put yourself and others in danger while you feel lightheaded. When should you call for help? Call 911 anytime you think you may need emergency care. For example, call if:    · You have symptoms of a stroke. These may include:  ? Sudden numbness, tingling, weakness, or loss of movement in your face, arm, or leg, especially on only one side of your body. ? Sudden vision changes. ? Sudden trouble speaking.   ? Sudden confusion or trouble understanding simple statements. ? Sudden problems with walking or balance. ? A sudden, severe headache that is different from past headaches.     · You have symptoms of a heart attack. These may include:  ? Chest pain or pressure, or a strange feeling in the chest.  ? Sweating. ? Shortness of breath. ? Nausea or vomiting. ? Pain, pressure, or a strange feeling in the back, neck, jaw, or upper belly or in one or both shoulders or arms. ? Lightheadedness or sudden weakness. ? A fast or irregular heartbeat. After you call  911, the  may tell you to chew 1 adult-strength or 2 to 4 low-dose aspirin. Wait for an ambulance. Do not try to drive yourself.    Watch closely for changes in your health, and be sure to contact your doctor if:    · Your lightheadedness gets worse or does not get better with home care. Where can you learn more? Go to http://amnaAdmittedlyeulalia.info/  Enter [id number] in the search box to learn more about \"Lightheadedness or Faintness: Care Instructions. \"  Current as of: June 26, 2019Content Version: 12.4  © 1867-3173 [employer]. Care instructions adapted under license by [employer] (which disclaims liability or warranty for this information). If you have questions about a medical condition or this instruction, always ask your healthcare professional. [place] [address] any warranty or liability for your use of this information. Patient Education        Miscarriage: Care Instructions  Your Care Instructions    The loss of a pregnancy can be very hard. You may wonder why it happened or blame yourself. Miscarriages are common and are not caused by exercise, stress, or sex. Most happen because the fertilized egg in the uterus does not develop normally. There is no treatment that can stop a miscarriage. As long as you do not have heavy blood loss, fever, weakness, or other signs of infection, you can let a miscarriage follow its own course.  This can take several days. Your body will recover over the next several weeks. Having a miscarriage does not mean you cannot have a normal pregnancy in the future. The doctor has checked you carefully, but problems can develop later. If you notice any problems or new symptoms, get medical treatment right away. Follow-up care is a key part of your treatment and safety. Be sure to make and go to all appointments, and call your doctor if you are having problems. It's also a good idea to know your test results and keep a list of the medicines you take. How can you care for yourself at home? · You will probably have some vaginal bleeding for 1 to 2 weeks. It may be similar to or slightly heavier than a normal period. The bleeding should get lighter after a week. Use pads instead of tampons. You may use tampons during your next period, which should start in 3 to 6 weeks. · Take an over-the-counter pain medicine, such as acetaminophen (Tylenol), ibuprofen (Advil, Motrin), or naproxen (Aleve) for cramps. Read and follow all instructions on the label. You may have cramps for several days after the miscarriage. · Do not take two or more pain medicines at the same time unless the doctor told you to. Many pain medicines have acetaminophen, which is Tylenol. Too much acetaminophen (Tylenol) can be harmful. · Use a clear container to save any tissue that you pass. Take it to your doctor's office as soon as you can. · Do not have sex until the bleeding stops. · You may return to your normal activities if you feel well enough to do so. But you should avoid heavy exercise until the bleeding stops. · If you would like to try to get pregnant again, it is usually safe whenever you feel ready. Talk with your doctor about any future pregnancy plans. · If you do not want to get pregnant, ask your doctor about birth control. You can get pregnant again before your next period starts if you are not using birth control.   · You may be low in iron because of blood loss. Eat a balanced diet that is high in iron and vitamin C. Foods rich in iron include red meat, shellfish, eggs, beans, and leafy green vegetables. Foods high in vitamin C include citrus fruits, tomatoes, and broccoli. Talk to your doctor about whether you need to take iron pills or a multivitamin. · The loss of a pregnancy can be very hard. You may wonder why it happened and blame yourself. Talking to family members, friends, a counselor, or your doctor may help you cope with your loss. When should you call for help? Call 911 anytime you think you may need emergency care. For example, call if:    · You passed out (lost consciousness).    Call your doctor now or seek immediate medical care if:    · You have severe vaginal bleeding.     · You are dizzy or lightheaded, or you feel like you may faint.     · You have new or worse pain in your belly or pelvis.     · You have a fever.     · You have vaginal discharge that smells bad.    Watch closely for changes in your health, and be sure to contact your doctor if:    · You do not get better as expected. Where can you learn more? Go to http://amna-eulalia.info/  Enter E802 in the search box to learn more about \"Miscarriage: Care Instructions. \"  Current as of: May 29, 2019Content Version: 12.4  © 7173-1518 [employer]. Care instructions adapted under license by [employer] (which disclaims liability or warranty for this information). If you have questions about a medical condition or this instruction, always ask your healthcare professional. [place] [address] any warranty or liability for your use of this information. Drink plenty of fluid. Follow-up with your [facility] doctor as needed. Return if any emergency.

## 2020-03-27 NOTE — ED PROVIDER NOTES
HPI:  25-year-old female polycystic ovary disease currently 11 weeks pregnant with a nonviable pregnancy diagnosed this morning at 2 PM at her [facility] on ultrasound. Went home started to have some cramping. At 830 started to pass clots. She had 2 syncopal episode. No trauma. Denies any back pain. Some cramping in the lower abdomen. No history of PE. No shortness of breath, chest pain, severe dizziness or headaches. No unilateral weakness tingling or numbness. ROS  Constitutional: No fever, no chills  Skin: no rash  Eye: No vision changes  ENMT: No sore throat  Respiratory: No shortness of breath, no cough  Cardiovascular: No chest pain, no palpitations  Gastrointestinal: No vomiting, no nausea, no diarrhea, no abdominal pain  : No dysuria  MSK: No back pain, no muscle pain, no joint pain  Neuro: No headache, no change in mental status, no numbness, no tingling, no weakness  Psych:   Endocrine:   All other review of systems positive per history of present illness and the above otherwise negative or noncontributory. Visit Vitals  /55   Pulse 62   Temp 98.3 °F (36.8 °C)   Resp 18   SpO2 99%     Past Medical History:   Diagnosis Date    Allergic rhinitis     Infertility, female     Polycystic disease, ovaries     Psychiatric problem     depression-not on medication during pregnancy     Past Surgical History:   Procedure Laterality Date    HX HEENT      tonsils and adenoids    HX OTHER SURGICAL      tonsils and adenoids removed     Prior to Admission Medications   Prescriptions Last Dose Informant Patient Reported? Taking?   escitalopram oxalate (LEXAPRO) 10 mg tablet   No No   Sig: Take 1 Tab by mouth daily. ethinyl estradiol-etonogestrel (NUVARING) 0.12-0.015 mg/24 hr vaginal ring   Yes No   Sig: Insert 1 Each into vagina. ferrous sulfate (SLOW FE) 142 mg (45 mg iron) ER tablet   No No   Sig: Take 1 Tab by mouth Daily (before breakfast).    permethrin (ELIMITE) 5 % topical cream No No   Sig: apply sparingly as directed      Facility-Administered Medications: None         Adult Exam   General: alert, no acute distress  Head: normocephalic, atraumatic  ENT: moist mucous membranes  Neck: supple, non-tender; full range of motion  Cardiovascular: regular rate and rhythm, normal peripheral perfusion, no edema  Respiratory:  normal respirations; no wheezing, rales or rhonchi  Gastrointestinal: soft, non-tender; no rebound or guarding, no peritoneal signs, no distension  Back: non-tender, full range of motion  Musculoskeletal: normal range of motion, normal strength, no gross deformities  Neurological: alert and oriented x 4, no gross focal deficits; normal speech  Psychiatric: cooperative; appropriate mood and affect    MDM:  Vitals stable. Nontoxic and otherwise well-appearing. Labs unremarkable here other than a mild leukocytosis of 13.8. She does not have any UTI symptoms. IV fluid given. EKG rate of 70 normal sinus rhythm with no. No STEMI or ischemic changes noted. No ectopy or Brugada. 11:35 PM  Urine orthostatic testing upon standing her blood pressure dropped from 093 systolic to 50 systolic. Complete syncopal episode. No seizure-like activity. Bleeding noted vaginally. No UTI symptoms. Patient placed back into bed. Will speak with [facility]. Will recommend admission for IV hydration and observation for syncope. 12:42 AM  With [facility] Mere Ang [facility]. They do not have privileges at our hospital.  He spoke directly with Dr. Gia Kinsey or [facility] hospitalist.  I also spoke with Dr. Gia Kinsey. She would like an ultrasound tonight to evaluate for retained product. She will also order for 600 misoprostol. She will come and speak with the patient for further evaluation to see whether a D&C is necessary. Patient is in agreement with plan. Blood pressure is 491 systolic at this time and she is complaining of some worsening cramping.   Will give 4 mg of IV morphine and IV Zofran. No results found. Recent Results (from the past 24 hour(s))   CBC WITH AUTOMATED DIFF    Collection Time: 03/26/20 10:20 PM   Result Value Ref Range    WBC 13.8 (H) 4.3 - 11.1 K/uL    RBC 3.93 (L) 4.05 - 5.2 M/uL    HGB 11.5 (L) 11.7 - 15.4 g/dL    HCT 35.1 (L) 35.8 - 46.3 %    MCV 89.3 79.6 - 97.8 FL    MCH 29.3 26.1 - 32.9 PG    MCHC 32.8 31.4 - 35.0 g/dL    RDW 12.9 11.9 - 14.6 %    PLATELET 580 840 - 889 K/uL    MPV 10.1 9.4 - 12.3 FL    ABSOLUTE NRBC 0.00 0.0 - 0.2 K/uL    DF AUTOMATED      NEUTROPHILS 78 43 - 78 %    LYMPHOCYTES 16 13 - 44 %    MONOCYTES 4 4.0 - 12.0 %    EOSINOPHILS 1 0.5 - 7.8 %    BASOPHILS 0 0.0 - 2.0 %    IMMATURE GRANULOCYTES 0 0.0 - 5.0 %    ABS. NEUTROPHILS 10.7 (H) 1.7 - 8.2 K/UL    ABS. LYMPHOCYTES 2.2 0.5 - 4.6 K/UL    ABS. MONOCYTES 0.6 0.1 - 1.3 K/UL    ABS. EOSINOPHILS 0.2 0.0 - 0.8 K/UL    ABS. BASOPHILS 0.0 0.0 - 0.2 K/UL    ABS. IMM. GRANS. 0.0 0.0 - 0.5 K/UL   METABOLIC PANEL, COMPREHENSIVE    Collection Time: 03/26/20 10:20 PM   Result Value Ref Range    Sodium 140 136 - 145 mmol/L    Potassium 3.6 3.5 - 5.1 mmol/L    Chloride 109 (H) 98 - 107 mmol/L    CO2 25 21 - 32 mmol/L    Anion gap 6 (L) 7 - 16 mmol/L    Glucose 121 (H) 65 - 100 mg/dL    BUN 10 6 - 23 MG/DL    Creatinine 0.83 0.6 - 1.0 MG/DL    GFR est AA >60 >60 ml/min/1.73m2    GFR est non-AA >60 >60 ml/min/1.73m2    Calcium 9.2 8.3 - 10.4 MG/DL    Bilirubin, total 0.5 0.2 - 1.1 MG/DL    ALT (SGPT) 16 12 - 65 U/L    AST (SGOT) 14 (L) 15 - 37 U/L    Alk. phosphatase 44 (L) 50 - 130 U/L    Protein, total 6.9 6.3 - 8.2 g/dL    Albumin 3.3 (L) 3.5 - 5.0 g/dL    Globulin 3.6 (H) 2.3 - 3.5 g/dL    A-G Ratio 0.9 (L) 1.2 - 3.5           Dragon voice recognition software was used to create this note. Although the note has been reviewed and corrected where necessary, additional errors may have been overlooked and remain in the text.

## 2020-03-27 NOTE — ED TRIAGE NOTES
Pt presents by EMS from home, c/o 2 episodes of syncope at home today, 11 weeks pregnant with confirmed miscarriage today, + vaginal bleeding. .

## 2020-03-30 NOTE — PROGRESS NOTES
Patient was seen and evaluated by the [facility] hospitalist Dr. Tiffany Quintana. Patient refused admission and was feeling better and wanted to be discharged home. She had received IV fluids in the emergency room and had negative ultrasounds to indicate retained products of conception. She does have an OB to follow-up with. Called to check on patient. She was given test results. She is feeling much better.

## 2020-05-18 ENCOUNTER — HOSPITAL ENCOUNTER (EMERGENCY)
Age: 30
Discharge: HOME | End: 2020-05-18
Attending: EMERGENCY MEDICINE
Payer: COMMERCIAL

## 2020-05-18 VITALS
WEIGHT: 254 LBS | TEMPERATURE: 98 F | OXYGEN SATURATION: 99 % | DIASTOLIC BLOOD PRESSURE: 81 MMHG | HEART RATE: 65 BPM | HEIGHT: 65 IN | BODY MASS INDEX: 42.32 KG/M2 | SYSTOLIC BLOOD PRESSURE: 168 MMHG | RESPIRATION RATE: 19 BRPM

## 2020-05-18 DIAGNOSIS — E86.0 DEHYDRATION: ICD-10-CM

## 2020-05-18 DIAGNOSIS — K52.9 GASTROENTERITIS, ACUTE: Primary | ICD-10-CM

## 2020-05-18 LAB
ALBUMIN SERPL-MCNC: 3.5 G/DL (ref 3.5–5)
ALBUMIN/GLOB SERPL: 0.8 {RATIO} (ref 1.2–3.5)
ALP SERPL-CCNC: 40 U/L (ref 50–130)
ALT SERPL-CCNC: 13 U/L (ref 12–65)
ANION GAP SERPL CALC-SCNC: 10 MMOL/L (ref 7–16)
APPEARANCE UR: CLEAR
AST SERPL-CCNC: 14 U/L (ref 15–37)
BACTERIA URNS QL MICRO: 0 /HPF
BASOPHILS # BLD: 0 K/UL (ref 0–0.2)
BASOPHILS NFR BLD: 0 % (ref 0–2)
BILIRUB SERPL-MCNC: 0.5 MG/DL (ref 0.2–1.1)
BILIRUB UR QL: NEGATIVE
BUN SERPL-MCNC: 17 MG/DL (ref 6–23)
CALCIUM SERPL-MCNC: 9.2 MG/DL (ref 8.3–10.4)
CASTS URNS QL MICRO: ABNORMAL /LPF
CHLORIDE SERPL-SCNC: 108 MMOL/L (ref 98–107)
CO2 SERPL-SCNC: 22 MMOL/L (ref 21–32)
COLOR UR: YELLOW
CREAT SERPL-MCNC: 0.75 MG/DL (ref 0.6–1)
DIFFERENTIAL METHOD BLD: ABNORMAL
EOSINOPHIL # BLD: 0 K/UL (ref 0–0.8)
EOSINOPHIL NFR BLD: 0 % (ref 0.5–7.8)
EPI CELLS #/AREA URNS HPF: ABNORMAL /HPF
ERYTHROCYTE [DISTWIDTH] IN BLOOD BY AUTOMATED COUNT: 13.6 % (ref 11.9–14.6)
GLOBULIN SER CALC-MCNC: 4.2 G/DL (ref 2.3–3.5)
GLUCOSE SERPL-MCNC: 115 MG/DL (ref 65–100)
GLUCOSE UR STRIP.AUTO-MCNC: NEGATIVE MG/DL
HCT VFR BLD AUTO: 38.8 % (ref 35.8–46.3)
HGB BLD-MCNC: 12.6 G/DL (ref 11.7–15.4)
HGB UR QL STRIP: ABNORMAL
IMM GRANULOCYTES # BLD AUTO: 0 K/UL (ref 0–0.5)
IMM GRANULOCYTES NFR BLD AUTO: 0 % (ref 0–5)
KETONES UR QL STRIP.AUTO: 80 MG/DL
LEUKOCYTE ESTERASE UR QL STRIP.AUTO: NEGATIVE
LIPASE SERPL-CCNC: 112 U/L (ref 73–393)
LYMPHOCYTES # BLD: 1.5 K/UL (ref 0.5–4.6)
LYMPHOCYTES NFR BLD: 12 % (ref 13–44)
MCH RBC QN AUTO: 27 PG (ref 26.1–32.9)
MCHC RBC AUTO-ENTMCNC: 32.5 G/DL (ref 31.4–35)
MCV RBC AUTO: 83.1 FL (ref 79.6–97.8)
MONOCYTES # BLD: 0.2 K/UL (ref 0.1–1.3)
MONOCYTES NFR BLD: 2 % (ref 4–12)
NEUTS SEG # BLD: 10 K/UL (ref 1.7–8.2)
NEUTS SEG NFR BLD: 85 % (ref 43–78)
NITRITE UR QL STRIP.AUTO: NEGATIVE
NRBC # BLD: 0 K/UL (ref 0–0.2)
PH UR STRIP: 7 [PH] (ref 5–9)
PLATELET # BLD AUTO: 327 K/UL (ref 150–450)
PMV BLD AUTO: 9.7 FL (ref 9.4–12.3)
POTASSIUM SERPL-SCNC: 3.7 MMOL/L (ref 3.5–5.1)
PROT SERPL-MCNC: 7.7 G/DL (ref 6.3–8.2)
PROT UR STRIP-MCNC: ABNORMAL MG/DL
RBC # BLD AUTO: 4.67 M/UL (ref 4.05–5.2)
RBC #/AREA URNS HPF: ABNORMAL /HPF
SODIUM SERPL-SCNC: 140 MMOL/L (ref 136–145)
SP GR UR REFRACTOMETRY: 1.03 (ref 1–1.02)
UROBILINOGEN UR QL STRIP.AUTO: 0.2 EU/DL (ref 0.2–1)
WBC # BLD AUTO: 11.7 K/UL (ref 4.3–11.1)
WBC URNS QL MICRO: ABNORMAL /HPF

## 2020-05-18 PROCEDURE — 80053 COMPREHEN METABOLIC PANEL: CPT

## 2020-05-18 PROCEDURE — 74011250636 HC RX REV CODE- 250/636: Performed by: EMERGENCY MEDICINE

## 2020-05-18 PROCEDURE — 81003 URINALYSIS AUTO W/O SCOPE: CPT

## 2020-05-18 PROCEDURE — 96374 THER/PROPH/DIAG INJ IV PUSH: CPT

## 2020-05-18 PROCEDURE — 93005 ELECTROCARDIOGRAM TRACING: CPT | Performed by: EMERGENCY MEDICINE

## 2020-05-18 PROCEDURE — 83690 ASSAY OF LIPASE: CPT

## 2020-05-18 PROCEDURE — 85025 COMPLETE CBC W/AUTO DIFF WBC: CPT

## 2020-05-18 PROCEDURE — 96361 HYDRATE IV INFUSION ADD-ON: CPT

## 2020-05-18 PROCEDURE — 99285 EMERGENCY DEPT VISIT HI MDM: CPT

## 2020-05-18 RX ORDER — SODIUM CHLORIDE 9 MG/ML
1000 INJECTION, SOLUTION INTRAVENOUS ONCE
Status: COMPLETED | OUTPATIENT
Start: 2020-05-18 | End: 2020-05-18

## 2020-05-18 RX ORDER — ONDANSETRON 4 MG/1
4 TABLET, ORALLY DISINTEGRATING ORAL
Qty: 11 TAB | Refills: 1 | Status: SHIPPED | OUTPATIENT
Start: 2020-05-18

## 2020-05-18 RX ORDER — SODIUM CHLORIDE, SODIUM LACTATE, POTASSIUM CHLORIDE, CALCIUM CHLORIDE 600; 310; 30; 20 MG/100ML; MG/100ML; MG/100ML; MG/100ML
1000 INJECTION, SOLUTION INTRAVENOUS CONTINUOUS
Status: DISCONTINUED | OUTPATIENT
Start: 2020-05-18 | End: 2020-05-18 | Stop reason: HOSPADM

## 2020-05-18 RX ORDER — ONDANSETRON 2 MG/ML
4 INJECTION INTRAMUSCULAR; INTRAVENOUS
Status: COMPLETED | OUTPATIENT
Start: 2020-05-18 | End: 2020-05-18

## 2020-05-18 RX ADMIN — SODIUM CHLORIDE, SODIUM LACTATE, POTASSIUM CHLORIDE, AND CALCIUM CHLORIDE 1000 ML: 600; 310; 30; 20 INJECTION, SOLUTION INTRAVENOUS at 13:01

## 2020-05-18 RX ADMIN — SODIUM CHLORIDE 1000 ML: 900 INJECTION, SOLUTION INTRAVENOUS at 10:35

## 2020-05-18 RX ADMIN — ONDANSETRON 4 MG: 2 INJECTION INTRAMUSCULAR; INTRAVENOUS at 10:27

## 2020-05-18 NOTE — ED TRIAGE NOTES
Pt states she has been vomiting with diarrhea and epigastric pain after eating some leftovers last night. States she has been vomiting about every 45 mins since 2100 last night. States she feels SOB some and then the epigastric pain will start and then she will vomit. States she took a \"sexual stimulant shot\" last night also. Pt has on a mask prior to triage.

## 2020-05-18 NOTE — ED NOTES
I have reviewed discharge instructions with the patient. The patient verbalized understanding. Patient left ED via Discharge Method: ambulatory to Home. Opportunity for questions and clarification provided. Patient given 1 scripts. To continue your aftercare when you leave the hospital, you may receive an automated call from our care team to check in on how you are doing. This is a free service and part of our promise to provide the best care and service to meet your aftercare needs.  If you have questions, or wish to unsubscribe from this service please call [phone]. Thank you for Choosing our [facility] Emergency Department.

## 2020-05-18 NOTE — ED NOTES
Patient provided with water at this time for PO challenge, patient began sipping prior to this RN leaving room. Patient had just returned from restroom stating she had burped and felt better.

## 2020-05-18 NOTE — ED PROVIDER NOTES
[facility] Emergency Department  Arrival Date/Time: 5/18/2020 @ [address] Olya Vasquez  MRN: [id number]      61 y.o. female    YOB: 1990   [phone] (home) [phone] (work)    [facility] DEPT ER04/04  Seen on 5/18/2020 @ 10:20 AM      Today's Chief Complaint:   Chief Complaint   Patient presents with    Vomiting     HPI: 30-year-old female presents to the emergency department several hours of 3:00 in the morning. She took Pepto x2 vomited the first dose    Had some watery stools this morning    Complains of upper abdominal pain without rebound masses or guarding. No fever    She also complains of shortness of breath, hard to take a deep breath. That started after she started vomiting    No sick contacts. She been working from home. HPI    Review of Systems: Review of Systems   Constitutional: Positive for appetite change. Negative for fever. HENT: Negative. Respiratory: Negative. Cardiovascular: Negative. Gastrointestinal: Positive for abdominal pain, diarrhea, nausea and vomiting. Genitourinary: Negative. Musculoskeletal: Negative. Skin: Negative. Neurological: Negative. Psychiatric/Behavioral: Negative. Past Medical History: Primary Care Doctor: Koffi Olmstead MD  Meds, PMH, PSHx, SocHx at end of this note     Allergies: No Known Allergies      Key Anti-Platelet Anticoagulant Meds     The patient is on no antiplatelet meds or anticoagulants. Physical Exam:  Nursing documentation reviewed.      Patient Vitals for the past 24 hrs:   Temp Pulse Resp BP SpO2   05/18/20 1400  65  168/81 99 %   05/18/20 1355  (!) 49 19  99 %   05/18/20 1330  63  132/89 98 %   05/18/20 1307  (!) 51 16  96 %   05/18/20 1300    121/61    05/18/20 1159  (!) 58 16  95 %   05/18/20 1142  (!) 44 13  95 %   05/18/20 1123  (!) 49 17  99 %   05/18/20 1057  (!) 44 17  98 %   05/18/20 1035  (!) 49 13  98 %   05/18/20 1009 98 °F (36.7 °C) (!) 55 16 126/84 97 %   05/18/20 1005    126/84     Vital signs were reviewed. Physical Exam  Vitals signs and nursing note reviewed. Constitutional:       General: She is not in acute distress. Appearance: She is ill-appearing. She is not toxic-appearing. HENT:      Head: Normocephalic. Nose: No congestion or rhinorrhea. Mouth/Throat:      Mouth: Mucous membranes are moist.   Eyes:      Pupils: Pupils are equal, round, and reactive to light. Cardiovascular:      Rate and Rhythm: Regular rhythm. Bradycardia present. Pulses: Normal pulses. Heart sounds: Normal heart sounds. Pulmonary:      Effort: Pulmonary effort is normal. No respiratory distress. Breath sounds: Normal breath sounds. No wheezing. Abdominal:      General: There is no distension. Tenderness: There is no abdominal tenderness. There is no guarding or rebound. Musculoskeletal: Normal range of motion. Skin:     General: Skin is warm and dry. Capillary Refill: Capillary refill takes less than 2 seconds. Neurological:      Mental Status: She is alert and oriented to person, place, and time. Psychiatric:         Mood and Affect: Mood normal.         Behavior: Behavior normal.         MEDICAL DECISION MAKING: (Including Differential Dx, and Plan)   MDM  Number of Diagnoses or Management Options  Dehydration:   Gastroenteritis, acute:   Diagnosis management comments: 76-year-old female presents to the emergency department with nausea vomiting some watery stool    Upper abdominal pain without rebound masses or guarding    Differential includes food poisoning or other food or water borne infection. Pancreatitis, biliary colic, colitis    At this time I do not think she needs any imaging. We will hydrate her. Give her some nausea medicine check labs and reassess.        Amount and/or Complexity of Data Reviewed  Clinical lab tests: ordered    Risk of Complications, Morbidity, and/or Mortality  Presenting problems: moderate  Diagnostic procedures: minimal  Management options: moderate            Data/Management: (.addlabs . addrad . addmeds . addold . addall)     Lab findings during this visit:   Recent Results (from the past 50 hour(s))   EKG, 12 LEAD, INITIAL    Collection Time: 05/18/20 10:11 AM   Result Value Ref Range    Ventricular Rate 48 BPM    Atrial Rate 48 BPM    P-R Interval 152 ms    QRS Duration 84 ms    Q-T Interval 418 ms    QTC Calculation (Bezet) 373 ms    Calculated P Axis 33 degrees    Calculated R Axis 44 degrees    Calculated T Axis 29 degrees    Diagnosis       Marked sinus bradycardia  Abnormal ECG  When compared with ECG of 26-MAR-2020 22:28,  No significant change was found     CBC WITH AUTOMATED DIFF    Collection Time: 05/18/20 10:20 AM   Result Value Ref Range    WBC 11.7 (H) 4.3 - 11.1 K/uL    RBC 4.67 4.05 - 5.2 M/uL    HGB 12.6 11.7 - 15.4 g/dL    HCT 38.8 35.8 - 46.3 %    MCV 83.1 79.6 - 97.8 FL    MCH 27.0 26.1 - 32.9 PG    MCHC 32.5 31.4 - 35.0 g/dL    RDW 13.6 11.9 - 14.6 %    PLATELET 668 699 - 618 K/uL    MPV 9.7 9.4 - 12.3 FL    ABSOLUTE NRBC 0.00 0.0 - 0.2 K/uL    DF AUTOMATED      NEUTROPHILS 85 (H) 43 - 78 %    LYMPHOCYTES 12 (L) 13 - 44 %    MONOCYTES 2 (L) 4.0 - 12.0 %    EOSINOPHILS 0 (L) 0.5 - 7.8 %    BASOPHILS 0 0.0 - 2.0 %    IMMATURE GRANULOCYTES 0 0.0 - 5.0 %    ABS. NEUTROPHILS 10.0 (H) 1.7 - 8.2 K/UL    ABS. LYMPHOCYTES 1.5 0.5 - 4.6 K/UL    ABS. MONOCYTES 0.2 0.1 - 1.3 K/UL    ABS. EOSINOPHILS 0.0 0.0 - 0.8 K/UL    ABS. BASOPHILS 0.0 0.0 - 0.2 K/UL    ABS. IMM.  GRANS. 0.0 0.0 - 0.5 K/UL   METABOLIC PANEL, COMPREHENSIVE    Collection Time: 05/18/20 10:20 AM   Result Value Ref Range    Sodium 140 136 - 145 mmol/L    Potassium 3.7 3.5 - 5.1 mmol/L    Chloride 108 (H) 98 - 107 mmol/L    CO2 22 21 - 32 mmol/L    Anion gap 10 7 - 16 mmol/L    Glucose 115 (H) 65 - 100 mg/dL    BUN 17 6 - 23 MG/DL    Creatinine 0.75 0.6 - 1.0 MG/DL    GFR est AA >60 >60 ml/min/1.73m2    GFR est non-AA >60 >60 ml/min/1.73m2    Calcium 9.2 8.3 - 10.4 MG/DL    Bilirubin, total 0.5 0.2 - 1.1 MG/DL    ALT (SGPT) 13 12 - 65 U/L    AST (SGOT) 14 (L) 15 - 37 U/L    Alk. phosphatase 40 (L) 50 - 130 U/L    Protein, total 7.7 6.3 - 8.2 g/dL    Albumin 3.5 3.5 - 5.0 g/dL    Globulin 4.2 (H) 2.3 - 3.5 g/dL    A-G Ratio 0.8 (L) 1.2 - 3.5     LIPASE    Collection Time: 05/18/20 10:20 AM   Result Value Ref Range    Lipase 112 73 - 393 U/L   URINALYSIS W/ RFLX MICROSCOPIC    Collection Time: 05/18/20 10:30 AM   Result Value Ref Range    Color YELLOW      Appearance CLEAR      Specific gravity 1.028 (H) 1.001 - 1.023      pH (UA) 7.0 5.0 - 9.0      Protein TRACE (A) NEG mg/dL    Glucose Negative NEG mg/dL    Ketone 80 (A) NEG mg/dL    Bilirubin Negative NEG      Blood TRACE (A) NEG      Urobilinogen 0.2 0.2 - 1.0 EU/dL    Nitrites Negative NEG      Leukocyte Esterase Negative NEG      WBC 0-3 0 /hpf    RBC 10-20 0 /hpf    Epithelial cells 0-3 0 /hpf    Bacteria 0 0 /hpf    Casts 0-3 0 /lpf     Radiology studies during this visit: No results found. Medications given in the ED:   Medications   lactated Ringers infusion 1,000 mL (0 mL IntraVENous IV Completed 5/18/20 1405)   ondansetron (ZOFRAN) injection 4 mg (4 mg IntraVENous Given 5/18/20 1027)   0.9% sodium chloride infusion 1,000 mL (0 mL IntraVENous IV Completed 5/18/20 1135)      Procedure Documentation:   Procedures     Recheck and Additional Documentation:  (use .addrecheck  . addsepsis   . addstroke   . addhip  . addhandoff  . addcctime . emergcnt )     Resting. She looks well. Given IV fluids here in the emergency department tolerating p.o. Will discharge home. Other ED Course Notes:        I wore appropriate PPE throughout this patient's ED encounter. Assessment and Plan: (talia)   Impression:     ICD-10-CM ICD-9-CM    1. Gastroenteritis, acute K52.9 558.9    2.  Dehydration E86.0 276.51       Disposition: Home   Follow-up Information     Follow up With Specialties Details Why Contact Info    Rocky Ma MD Family Practice   [address] [phone]      [facility] EMERGENCY DEPT Emergency Medicine  come back if you get worse or have any new problems [address] [place] [facility] [address] [phone]        Discharge Medication List as of 5/18/2020  1:55 PM      START taking these medications    Details   ondansetron (Zofran ODT) 4 mg disintegrating tablet Take 1 Tab by mouth every eight (8) hours as needed for Nausea., Normal, Disp-11 Tab, R-1         CONTINUE these medications which have NOT CHANGED    Details   ethinyl estradiol-etonogestrel (NUVARING) 0.12-0.015 mg/24 hr vaginal ring Insert 1 Each into vagina., Historical Med      permethrin (ELIMITE) 5 % topical cream apply sparingly as directed, Normal, Disp-60 g, R-3      ferrous sulfate (SLOW FE) 142 mg (45 mg iron) ER tablet Take 1 Tab by mouth Daily (before breakfast). , Print, Disp-60 Tab, R-1      escitalopram oxalate (LEXAPRO) 10 mg tablet Take 1 Tab by mouth daily. , Normal, Disp-90 Tab, R-3               Past Medical History:      Past Medical History:   Diagnosis Date    Allergic rhinitis     Infertility, female     Polycystic disease, ovaries     Psychiatric problem     depression-not on medication during pregnancy     Past Surgical History:   Procedure Laterality Date    HX HEENT      tonsils and adenoids    HX OTHER SURGICAL      tonsils and adenoids removed     Social History     Tobacco Use    Smoking status: Current Every Day Smoker    Smokeless tobacco: Never Used    Tobacco comment: Vaping occasionally   Substance Use Topics    Alcohol use: No     Comment: 8 to 10 glasses of wine per week    Drug use: No     Prior to Admission Medications   Prescriptions Last Dose Informant Patient Reported? Taking?   escitalopram oxalate (LEXAPRO) 10 mg tablet   No No   Sig: Take 1 Tab by mouth daily.    ethinyl estradiol-etonogestrel (NUVARING) 0.12-0.015 mg/24 hr vaginal ring   Yes No   Sig: Insert 1 Each into vagina. ferrous sulfate (SLOW FE) 142 mg (45 mg iron) ER tablet   No No   Sig: Take 1 Tab by mouth Daily (before breakfast).    permethrin (ELIMITE) 5 % topical cream   No No   Sig: apply sparingly as directed      Facility-Administered Medications: None

## 2020-05-19 LAB
ATRIAL RATE: 48 BPM
CALCULATED P AXIS, ECG09: 33 DEGREES
CALCULATED R AXIS, ECG10: 44 DEGREES
CALCULATED T AXIS, ECG11: 29 DEGREES
DIAGNOSIS, 93000: NORMAL
P-R INTERVAL, ECG05: 152 MS
Q-T INTERVAL, ECG07: 418 MS
QRS DURATION, ECG06: 84 MS
QTC CALCULATION (BEZET), ECG08: 373 MS
VENTRICULAR RATE, ECG03: 48 BPM

## 2022-03-28 NOTE — PROGRESS NOTES
SBAR report received from ORI Gutierrez RN, care assumed. The patient is a 89y Female complaining of pain, low back. Patient reports feeling better, presenting with better insight and judgement. Patient denied any suicidal or homicidal ideations. Patient denied any auditory or visual hallucinations. Patient is future oriented, optimistic and motivated to participate in Outpatient treatment. Patient shows no imminent danger to self, others at this time. Patient understands and verbalized agreement with treatment plan and following up with outpatient. Psychoeducation provided regarding diagnosis, medications, treatment and follow up. Risks, benefits, alternatives discussed, all questions and concerns addressed and answered.  Significant Behavioral Events: None

## (undated) DEVICE — REM POLYHESIVE ADULT PATIENT RETURN ELECTRODE: Brand: VALLEYLAB

## (undated) DEVICE — SOLUTION IV 1000ML 0.9% SOD CHL

## (undated) DEVICE — SUTURE VCRL SZ 0 L36IN ABSRB UD L36MM CT-1 1/2 CIR J946H

## (undated) DEVICE — (D)STRIP SKN CLSR 0.5X4IN WHT --

## (undated) DEVICE — SUTURE PLN GUT SZ 0 L54IN ABSRB YELLOWISH TAN MFIL TIE S104H

## (undated) DEVICE — Device: Brand: PORTEX

## (undated) DEVICE — SUTURE PDS II SZ 0 L27IN ABSRB VLT L36MM CT-1 1/2 CIR Z340H

## (undated) DEVICE — SUTURE MCRYL SZ 3-0 L27IN ABSRB UD L60MM KS STR REV CUT Y523H

## (undated) DEVICE — SOLUTION IRRIG 1000ML H2O STRL BLT

## (undated) DEVICE — AMD ANTIMICROBIAL GAUZE SPONGES,12 PLY USP TYPE VII, 0.2% POLYHEXAMETHYLENE BIGUANIDE HCI (PHMB): Brand: CURITY

## (undated) DEVICE — MEDI-VAC NON-CONDUCTIVE SUCTION TUBING: Brand: CARDINAL HEALTH

## (undated) DEVICE — (D)PREP SKN CHLRAPRP APPL 26ML -- CONVERT TO ITEM 371833

## (undated) DEVICE — SURGICAL PROCEDURE PACK C SECT CDS

## (undated) DEVICE — CATHETER URETH 16FR PVC 2 W F

## (undated) DEVICE — SUTURE MCRYL SZ 0 L36IN ABSRB UD L36MM CT-1 1/2 CIR Y946H

## (undated) DEVICE — STERILE POLYISOPRENE POWDER-FREE SURGICAL GLOVES: Brand: PROTEXIS

## (undated) DEVICE — AMD ANTIMICROBIAL NON-ADHERENT PAD,0.2% POLYHEXAMETHYLENE BIGUANIDE HCI (PHMB): Brand: TELFA

## (undated) DEVICE — PENCIL ES L3M BTTN SWCH S STL HEX LOK BLDE ELECTRD HOLSTER

## (undated) DEVICE — SUTURE PLN GUT SZ 2-0 L27IN ABSRB YELLOWISH TAN L40MM CT 853H

## (undated) DEVICE — SUTURE VCRL SZ 0 L36IN ABSRB UD L48MM CTX 1/2 CIR J978H

## (undated) DEVICE — SUTURE 2-0 L36IN ABSRB BRN CT L40MM 1/2 CIR TAPERPOINT SGL 913H